# Patient Record
Sex: MALE | Race: WHITE | NOT HISPANIC OR LATINO | Employment: OTHER | URBAN - METROPOLITAN AREA
[De-identification: names, ages, dates, MRNs, and addresses within clinical notes are randomized per-mention and may not be internally consistent; named-entity substitution may affect disease eponyms.]

---

## 2021-11-11 ENCOUNTER — HOSPITAL ENCOUNTER (OUTPATIENT)
Facility: CLINIC | Age: 81
Setting detail: OBSERVATION
Discharge: HOME | End: 2021-11-12
Attending: EMERGENCY MEDICINE | Admitting: INTERNAL MEDICINE
Payer: MEDICARE

## 2021-11-11 ENCOUNTER — APPOINTMENT (OUTPATIENT)
Dept: CT IMAGING | Facility: CLINIC | Age: 81
End: 2021-11-11
Attending: EMERGENCY MEDICINE
Payer: MEDICARE

## 2021-11-11 ENCOUNTER — APPOINTMENT (OUTPATIENT)
Dept: CARDIOLOGY | Facility: CLINIC | Age: 81
End: 2021-11-11
Attending: PHYSICIAN ASSISTANT
Payer: MEDICARE

## 2021-11-11 DIAGNOSIS — U07.1 INFECTION DUE TO 2019 NOVEL CORONAVIRUS: ICD-10-CM

## 2021-11-11 DIAGNOSIS — I21.4 NSTEMI (NON-ST ELEVATED MYOCARDIAL INFARCTION) (H): ICD-10-CM

## 2021-11-11 LAB
ALBUMIN SERPL-MCNC: 2.4 G/DL (ref 3.4–5)
ALP SERPL-CCNC: 117 U/L (ref 40–150)
ALT SERPL W P-5'-P-CCNC: 33 U/L (ref 0–70)
ANION GAP SERPL CALCULATED.3IONS-SCNC: 7 MMOL/L (ref 3–14)
AST SERPL W P-5'-P-CCNC: 32 U/L (ref 0–45)
ATRIAL RATE - MUSE: 61 BPM
ATRIAL RATE - MUSE: 64 BPM
ATRIAL RATE - MUSE: 65 BPM
BASE EXCESS BLDV CALC-SCNC: 0.4 MMOL/L (ref -7.7–1.9)
BASOPHILS # BLD AUTO: 0 10E3/UL (ref 0–0.2)
BASOPHILS NFR BLD AUTO: 1 %
BILIRUB DIRECT SERPL-MCNC: 0.2 MG/DL (ref 0–0.2)
BILIRUB SERPL-MCNC: 0.5 MG/DL (ref 0.2–1.3)
BUN SERPL-MCNC: 24 MG/DL (ref 7–30)
CALCIUM SERPL-MCNC: 8.2 MG/DL (ref 8.5–10.1)
CHLORIDE BLD-SCNC: 109 MMOL/L (ref 94–109)
CO2 SERPL-SCNC: 24 MMOL/L (ref 20–32)
CREAT SERPL-MCNC: 0.92 MG/DL (ref 0.66–1.25)
D DIMER PPP FEU-MCNC: 0.9 UG/ML FEU (ref 0–0.5)
DIASTOLIC BLOOD PRESSURE - MUSE: NORMAL MMHG
EOSINOPHIL # BLD AUTO: 0.1 10E3/UL (ref 0–0.7)
EOSINOPHIL NFR BLD AUTO: 4 %
ERYTHROCYTE [DISTWIDTH] IN BLOOD BY AUTOMATED COUNT: 13.2 % (ref 10–15)
GFR SERPL CREATININE-BSD FRML MDRD: 78 ML/MIN/1.73M2
GLUCOSE BLD-MCNC: 95 MG/DL (ref 70–99)
GLUCOSE BLDC GLUCOMTR-MCNC: 134 MG/DL (ref 70–99)
HCO3 BLDV-SCNC: 24 MMOL/L (ref 21–28)
HCT VFR BLD AUTO: 39.8 % (ref 40–53)
HGB BLD-MCNC: 12.4 G/DL (ref 13.3–17.7)
HOLD SPECIMEN: NORMAL
HOLD SPECIMEN: NORMAL
IMM GRANULOCYTES # BLD: 0 10E3/UL
IMM GRANULOCYTES NFR BLD: 0 %
INTERPRETATION ECG - MUSE: NORMAL
LACTATE SERPL-SCNC: 0.8 MMOL/L (ref 0.7–2)
LIPASE SERPL-CCNC: 49 U/L (ref 73–393)
LVEF ECHO: NORMAL
LYMPHOCYTES # BLD AUTO: 0.8 10E3/UL (ref 0.8–5.3)
LYMPHOCYTES NFR BLD AUTO: 24 %
MAGNESIUM SERPL-MCNC: 1.8 MG/DL (ref 1.6–2.3)
MCH RBC QN AUTO: 30 PG (ref 26.5–33)
MCHC RBC AUTO-ENTMCNC: 31.2 G/DL (ref 31.5–36.5)
MCV RBC AUTO: 96 FL (ref 78–100)
MONOCYTES # BLD AUTO: 0.5 10E3/UL (ref 0–1.3)
MONOCYTES NFR BLD AUTO: 15 %
NEUTROPHILS # BLD AUTO: 1.8 10E3/UL (ref 1.6–8.3)
NEUTROPHILS NFR BLD AUTO: 56 %
NRBC # BLD AUTO: 0 10E3/UL
NRBC BLD AUTO-RTO: 0 /100
O2/TOTAL GAS SETTING VFR VENT: 0 %
P AXIS - MUSE: 18 DEGREES
P AXIS - MUSE: 31 DEGREES
P AXIS - MUSE: 6 DEGREES
PCO2 BLDV: 36 MM HG (ref 40–50)
PH BLDV: 7.44 [PH] (ref 7.32–7.43)
PLATELET # BLD AUTO: 269 10E3/UL (ref 150–450)
PO2 BLDV: 52 MM HG (ref 25–47)
POTASSIUM BLD-SCNC: 3.6 MMOL/L (ref 3.4–5.3)
PR INTERVAL - MUSE: 182 MS
PR INTERVAL - MUSE: 194 MS
PR INTERVAL - MUSE: 198 MS
PROCALCITONIN SERPL-MCNC: <0.05 NG/ML
PROT SERPL-MCNC: 5.3 G/DL (ref 6.8–8.8)
QRS DURATION - MUSE: 86 MS
QRS DURATION - MUSE: 90 MS
QRS DURATION - MUSE: 90 MS
QT - MUSE: 464 MS
QT - MUSE: 470 MS
QT - MUSE: 480 MS
QTC - MUSE: 482 MS
QTC - MUSE: 483 MS
QTC - MUSE: 484 MS
R AXIS - MUSE: -17 DEGREES
R AXIS - MUSE: -22 DEGREES
R AXIS - MUSE: -26 DEGREES
RBC # BLD AUTO: 4.13 10E6/UL (ref 4.4–5.9)
SODIUM SERPL-SCNC: 140 MMOL/L (ref 133–144)
SYSTOLIC BLOOD PRESSURE - MUSE: NORMAL MMHG
T AXIS - MUSE: -3 DEGREES
T AXIS - MUSE: 22 DEGREES
T AXIS - MUSE: 28 DEGREES
TROPONIN I SERPL-MCNC: 0.12 UG/L (ref 0–0.04)
TROPONIN I SERPL-MCNC: 0.13 UG/L (ref 0–0.04)
TROPONIN I SERPL-MCNC: 0.13 UG/L (ref 0–0.04)
VENTRICULAR RATE- MUSE: 61 BPM
VENTRICULAR RATE- MUSE: 64 BPM
VENTRICULAR RATE- MUSE: 65 BPM
WBC # BLD AUTO: 3.3 10E3/UL (ref 4–11)

## 2021-11-11 PROCEDURE — 250N000011 HC RX IP 250 OP 636: Performed by: EMERGENCY MEDICINE

## 2021-11-11 PROCEDURE — 84145 PROCALCITONIN (PCT): CPT | Performed by: EMERGENCY MEDICINE

## 2021-11-11 PROCEDURE — 36415 COLL VENOUS BLD VENIPUNCTURE: CPT | Performed by: PHYSICIAN ASSISTANT

## 2021-11-11 PROCEDURE — 99204 OFFICE O/P NEW MOD 45 MIN: CPT | Mod: 25 | Performed by: INTERNAL MEDICINE

## 2021-11-11 PROCEDURE — 250N000013 HC RX MED GY IP 250 OP 250 PS 637: Performed by: INTERNAL MEDICINE

## 2021-11-11 PROCEDURE — 36415 COLL VENOUS BLD VENIPUNCTURE: CPT | Performed by: EMERGENCY MEDICINE

## 2021-11-11 PROCEDURE — 96365 THER/PROPH/DIAG IV INF INIT: CPT | Mod: 59

## 2021-11-11 PROCEDURE — 96368 THER/DIAG CONCURRENT INF: CPT

## 2021-11-11 PROCEDURE — 99220 PR INITIAL OBSERVATION CARE,LEVEL III: CPT | Performed by: INTERNAL MEDICINE

## 2021-11-11 PROCEDURE — 82248 BILIRUBIN DIRECT: CPT | Performed by: EMERGENCY MEDICINE

## 2021-11-11 PROCEDURE — G0378 HOSPITAL OBSERVATION PER HR: HCPCS

## 2021-11-11 PROCEDURE — 83735 ASSAY OF MAGNESIUM: CPT | Performed by: EMERGENCY MEDICINE

## 2021-11-11 PROCEDURE — 93325 DOPPLER ECHO COLOR FLOW MAPG: CPT | Mod: 26 | Performed by: INTERNAL MEDICINE

## 2021-11-11 PROCEDURE — C9113 INJ PANTOPRAZOLE SODIUM, VIA: HCPCS | Performed by: PHYSICIAN ASSISTANT

## 2021-11-11 PROCEDURE — 85004 AUTOMATED DIFF WBC COUNT: CPT | Performed by: EMERGENCY MEDICINE

## 2021-11-11 PROCEDURE — 96366 THER/PROPH/DIAG IV INF ADDON: CPT

## 2021-11-11 PROCEDURE — 84484 ASSAY OF TROPONIN QUANT: CPT | Performed by: EMERGENCY MEDICINE

## 2021-11-11 PROCEDURE — 258N000003 HC RX IP 258 OP 636: Performed by: PHYSICIAN ASSISTANT

## 2021-11-11 PROCEDURE — 999N000208 ECHOCARDIOGRAM LIMITED

## 2021-11-11 PROCEDURE — 99207 PR APP CREDIT; MD BILLING SHARED VISIT: CPT | Performed by: PHYSICIAN ASSISTANT

## 2021-11-11 PROCEDURE — 96376 TX/PRO/DX INJ SAME DRUG ADON: CPT | Mod: 59

## 2021-11-11 PROCEDURE — 258N000003 HC RX IP 258 OP 636: Performed by: EMERGENCY MEDICINE

## 2021-11-11 PROCEDURE — 93308 TTE F-UP OR LMTD: CPT | Mod: 26 | Performed by: INTERNAL MEDICINE

## 2021-11-11 PROCEDURE — 82803 BLOOD GASES ANY COMBINATION: CPT | Performed by: EMERGENCY MEDICINE

## 2021-11-11 PROCEDURE — 250N000013 HC RX MED GY IP 250 OP 250 PS 637: Performed by: EMERGENCY MEDICINE

## 2021-11-11 PROCEDURE — 250N000009 HC RX 250: Performed by: PHYSICIAN ASSISTANT

## 2021-11-11 PROCEDURE — 71275 CT ANGIOGRAPHY CHEST: CPT | Mod: MG

## 2021-11-11 PROCEDURE — 80048 BASIC METABOLIC PNL TOTAL CA: CPT | Performed by: EMERGENCY MEDICINE

## 2021-11-11 PROCEDURE — 83690 ASSAY OF LIPASE: CPT | Performed by: PHYSICIAN ASSISTANT

## 2021-11-11 PROCEDURE — 99285 EMERGENCY DEPT VISIT HI MDM: CPT | Mod: 25

## 2021-11-11 PROCEDURE — 93321 DOPPLER ECHO F-UP/LMTD STD: CPT | Mod: 26 | Performed by: INTERNAL MEDICINE

## 2021-11-11 PROCEDURE — 999N000208 ECHOCARDIOGRAM COMPLETE

## 2021-11-11 PROCEDURE — 93005 ELECTROCARDIOGRAM TRACING: CPT

## 2021-11-11 PROCEDURE — 250N000009 HC RX 250: Performed by: EMERGENCY MEDICINE

## 2021-11-11 PROCEDURE — 83605 ASSAY OF LACTIC ACID: CPT | Performed by: EMERGENCY MEDICINE

## 2021-11-11 PROCEDURE — 250N000013 HC RX MED GY IP 250 OP 250 PS 637: Performed by: PHYSICIAN ASSISTANT

## 2021-11-11 PROCEDURE — 255N000002 HC RX 255 OP 636: Performed by: EMERGENCY MEDICINE

## 2021-11-11 PROCEDURE — 82962 GLUCOSE BLOOD TEST: CPT

## 2021-11-11 PROCEDURE — 96375 TX/PRO/DX INJ NEW DRUG ADDON: CPT

## 2021-11-11 PROCEDURE — 96367 TX/PROPH/DG ADDL SEQ IV INF: CPT

## 2021-11-11 PROCEDURE — 85379 FIBRIN DEGRADATION QUANT: CPT | Performed by: EMERGENCY MEDICINE

## 2021-11-11 PROCEDURE — 250N000011 HC RX IP 250 OP 636: Performed by: PHYSICIAN ASSISTANT

## 2021-11-11 PROCEDURE — 84484 ASSAY OF TROPONIN QUANT: CPT | Mod: 91 | Performed by: PHYSICIAN ASSISTANT

## 2021-11-11 RX ORDER — RAMIPRIL 10 MG/1
10 CAPSULE ORAL 2 TIMES DAILY
COMMUNITY

## 2021-11-11 RX ORDER — ROSUVASTATIN CALCIUM 20 MG/1
20 TABLET, COATED ORAL AT BEDTIME
Status: DISCONTINUED | OUTPATIENT
Start: 2021-11-11 | End: 2021-11-12 | Stop reason: HOSPADM

## 2021-11-11 RX ORDER — ONDANSETRON 4 MG/1
4 TABLET, ORALLY DISINTEGRATING ORAL EVERY 6 HOURS PRN
Status: DISCONTINUED | OUTPATIENT
Start: 2021-11-11 | End: 2021-11-12 | Stop reason: HOSPADM

## 2021-11-11 RX ORDER — CLOPIDOGREL BISULFATE 75 MG/1
75 TABLET ORAL DAILY
COMMUNITY

## 2021-11-11 RX ORDER — ROSUVASTATIN CALCIUM 20 MG/1
20 TABLET, COATED ORAL AT BEDTIME
COMMUNITY

## 2021-11-11 RX ORDER — CLOPIDOGREL BISULFATE 75 MG/1
75 TABLET ORAL DAILY
Status: DISCONTINUED | OUTPATIENT
Start: 2021-11-11 | End: 2021-11-12 | Stop reason: HOSPADM

## 2021-11-11 RX ORDER — LIDOCAINE 40 MG/G
CREAM TOPICAL
Status: DISCONTINUED | OUTPATIENT
Start: 2021-11-11 | End: 2021-11-12 | Stop reason: HOSPADM

## 2021-11-11 RX ORDER — MORPHINE SULFATE 2 MG/ML
2 INJECTION, SOLUTION INTRAMUSCULAR; INTRAVENOUS
Status: DISCONTINUED | OUTPATIENT
Start: 2021-11-11 | End: 2021-11-12 | Stop reason: HOSPADM

## 2021-11-11 RX ORDER — POLYETHYLENE GLYCOL 3350 17 G/17G
17 POWDER, FOR SOLUTION ORAL DAILY
Status: DISCONTINUED | OUTPATIENT
Start: 2021-11-11 | End: 2021-11-12 | Stop reason: HOSPADM

## 2021-11-11 RX ORDER — PROCHLORPERAZINE 25 MG
12.5 SUPPOSITORY, RECTAL RECTAL EVERY 12 HOURS PRN
Status: DISCONTINUED | OUTPATIENT
Start: 2021-11-11 | End: 2021-11-12 | Stop reason: HOSPADM

## 2021-11-11 RX ORDER — UBIDECARENONE 100 MG
100 CAPSULE ORAL DAILY
COMMUNITY

## 2021-11-11 RX ORDER — BENZONATATE 100 MG/1
100 CAPSULE ORAL 3 TIMES DAILY PRN
Status: DISCONTINUED | OUTPATIENT
Start: 2021-11-11 | End: 2021-11-12 | Stop reason: HOSPADM

## 2021-11-11 RX ORDER — ASPIRIN 325 MG
325 TABLET ORAL ONCE
Status: COMPLETED | OUTPATIENT
Start: 2021-11-11 | End: 2021-11-11

## 2021-11-11 RX ORDER — AMLODIPINE BESYLATE 5 MG/1
5 TABLET ORAL DAILY
Status: DISCONTINUED | OUTPATIENT
Start: 2021-11-11 | End: 2021-11-12 | Stop reason: HOSPADM

## 2021-11-11 RX ORDER — LISINOPRIL 20 MG/1
20 TABLET ORAL DAILY
Status: DISCONTINUED | OUTPATIENT
Start: 2021-11-11 | End: 2021-11-12 | Stop reason: HOSPADM

## 2021-11-11 RX ORDER — ISOSORBIDE MONONITRATE 30 MG/1
90 TABLET, EXTENDED RELEASE ORAL DAILY
COMMUNITY

## 2021-11-11 RX ORDER — ONDANSETRON 2 MG/ML
4 INJECTION INTRAMUSCULAR; INTRAVENOUS EVERY 6 HOURS PRN
Status: DISCONTINUED | OUTPATIENT
Start: 2021-11-11 | End: 2021-11-12 | Stop reason: HOSPADM

## 2021-11-11 RX ORDER — MORPHINE SULFATE 2 MG/ML
2 INJECTION, SOLUTION INTRAMUSCULAR; INTRAVENOUS
Status: COMPLETED | OUTPATIENT
Start: 2021-11-11 | End: 2021-11-11

## 2021-11-11 RX ORDER — FUROSEMIDE 20 MG
10 TABLET ORAL DAILY
COMMUNITY

## 2021-11-11 RX ORDER — AMOXICILLIN 250 MG
2 CAPSULE ORAL 2 TIMES DAILY
Status: DISCONTINUED | OUTPATIENT
Start: 2021-11-11 | End: 2021-11-12 | Stop reason: HOSPADM

## 2021-11-11 RX ORDER — HEPARIN SODIUM 10000 [USP'U]/100ML
0-5000 INJECTION, SOLUTION INTRAVENOUS CONTINUOUS
Status: DISCONTINUED | OUTPATIENT
Start: 2021-11-11 | End: 2021-11-11

## 2021-11-11 RX ORDER — MAGNESIUM CARB/ALUMINUM HYDROX 105-160MG
148 TABLET,CHEWABLE ORAL ONCE
Status: COMPLETED | OUTPATIENT
Start: 2021-11-11 | End: 2021-11-11

## 2021-11-11 RX ORDER — CARVEDILOL 3.12 MG/1
3.12 TABLET ORAL
COMMUNITY

## 2021-11-11 RX ORDER — RANOLAZINE 500 MG/1
500 TABLET, EXTENDED RELEASE ORAL EVERY EVENING
COMMUNITY

## 2021-11-11 RX ORDER — ASPIRIN 81 MG/1
81 TABLET, CHEWABLE ORAL DAILY
COMMUNITY

## 2021-11-11 RX ORDER — ASPIRIN 81 MG/1
81 TABLET, CHEWABLE ORAL DAILY
Status: DISCONTINUED | OUTPATIENT
Start: 2021-11-12 | End: 2021-11-12 | Stop reason: HOSPADM

## 2021-11-11 RX ORDER — PROCHLORPERAZINE MALEATE 5 MG
5 TABLET ORAL EVERY 6 HOURS PRN
Status: DISCONTINUED | OUTPATIENT
Start: 2021-11-11 | End: 2021-11-12 | Stop reason: HOSPADM

## 2021-11-11 RX ORDER — RANOLAZINE 500 MG/1
500 TABLET, EXTENDED RELEASE ORAL EVERY EVENING
Status: DISCONTINUED | OUTPATIENT
Start: 2021-11-11 | End: 2021-11-12 | Stop reason: HOSPADM

## 2021-11-11 RX ORDER — RANOLAZINE 1000 MG/1
1000 TABLET, EXTENDED RELEASE ORAL EVERY MORNING
COMMUNITY

## 2021-11-11 RX ORDER — AMLODIPINE BESYLATE 5 MG/1
5 TABLET ORAL DAILY
COMMUNITY

## 2021-11-11 RX ORDER — IOPAMIDOL 755 MG/ML
500 INJECTION, SOLUTION INTRAVASCULAR ONCE
Status: COMPLETED | OUTPATIENT
Start: 2021-11-11 | End: 2021-11-11

## 2021-11-11 RX ORDER — MULTIPLE VITAMINS W/ MINERALS TAB 9MG-400MCG
1 TAB ORAL DAILY
COMMUNITY

## 2021-11-11 RX ORDER — CARVEDILOL 3.12 MG/1
3.12 TABLET ORAL 2 TIMES DAILY WITH MEALS
Status: DISCONTINUED | OUTPATIENT
Start: 2021-11-11 | End: 2021-11-12 | Stop reason: HOSPADM

## 2021-11-11 RX ORDER — RANOLAZINE 500 MG/1
1000 TABLET, EXTENDED RELEASE ORAL EVERY MORNING
Status: DISCONTINUED | OUTPATIENT
Start: 2021-11-11 | End: 2021-11-12 | Stop reason: HOSPADM

## 2021-11-11 RX ORDER — NITROGLYCERIN 0.4 MG/1
0.4 TABLET SUBLINGUAL EVERY 5 MIN PRN
Status: DISCONTINUED | OUTPATIENT
Start: 2021-11-11 | End: 2021-11-12 | Stop reason: HOSPADM

## 2021-11-11 RX ADMIN — SODIUM CHLORIDE 84 ML: 9 INJECTION, SOLUTION INTRAVENOUS at 06:55

## 2021-11-11 RX ADMIN — MORPHINE SULFATE 2 MG: 2 INJECTION, SOLUTION INTRAMUSCULAR; INTRAVENOUS at 10:16

## 2021-11-11 RX ADMIN — IOPAMIDOL 65 ML: 755 INJECTION, SOLUTION INTRAVENOUS at 06:55

## 2021-11-11 RX ADMIN — AMLODIPINE BESYLATE 5 MG: 5 TABLET ORAL at 12:59

## 2021-11-11 RX ADMIN — ROSUVASTATIN 20 MG: 20 TABLET, FILM COATED ORAL at 23:03

## 2021-11-11 RX ADMIN — ASPIRIN 325 MG ORAL TABLET 325 MG: 325 PILL ORAL at 06:41

## 2021-11-11 RX ADMIN — PANTOPRAZOLE SODIUM 40 MG: 40 INJECTION, POWDER, FOR SOLUTION INTRAVENOUS at 13:39

## 2021-11-11 RX ADMIN — DOCUSATE SODIUM AND SENNOSIDES 2 TABLET: 8.6; 5 TABLET, FILM COATED ORAL at 19:55

## 2021-11-11 RX ADMIN — HEPARIN SODIUM 1050 UNITS/HR: 1000 INJECTION INTRAVENOUS; SUBCUTANEOUS at 06:40

## 2021-11-11 RX ADMIN — HEPARIN SODIUM 1050 UNITS/HR: 1000 INJECTION INTRAVENOUS; SUBCUTANEOUS at 06:41

## 2021-11-11 RX ADMIN — NITROGLYCERIN 0.4 MG: 0.4 TABLET SUBLINGUAL at 12:04

## 2021-11-11 RX ADMIN — DOCUSATE SODIUM AND SENNOSIDES 2 TABLET: 8.6; 5 TABLET, FILM COATED ORAL at 12:58

## 2021-11-11 RX ADMIN — LIDOCAINE HYDROCHLORIDE 30 ML: 20 SOLUTION ORAL; TOPICAL at 12:04

## 2021-11-11 RX ADMIN — ISOSORBIDE MONONITRATE 90 MG: 60 TABLET, EXTENDED RELEASE ORAL at 12:59

## 2021-11-11 RX ADMIN — RANOLAZINE 500 MG: 500 TABLET, FILM COATED, EXTENDED RELEASE ORAL at 19:55

## 2021-11-11 RX ADMIN — MAGNESIUM CITRATE 148 ML: 1.75 LIQUID ORAL at 19:55

## 2021-11-11 RX ADMIN — CARVEDILOL 3.12 MG: 3.12 TABLET, FILM COATED ORAL at 19:54

## 2021-11-11 RX ADMIN — HUMAN ALBUMIN MICROSPHERES AND PERFLUTREN 3 ML: 10; .22 INJECTION, SOLUTION INTRAVENOUS at 11:22

## 2021-11-11 RX ADMIN — LISINOPRIL 20 MG: 20 TABLET ORAL at 12:59

## 2021-11-11 RX ADMIN — CLOPIDOGREL BISULFATE 75 MG: 75 TABLET ORAL at 12:59

## 2021-11-11 RX ADMIN — SODIUM CHLORIDE 50 ML: 9 INJECTION, SOLUTION INTRAVENOUS at 13:41

## 2021-11-11 RX ADMIN — CARVEDILOL 3.12 MG: 3.12 TABLET, FILM COATED ORAL at 12:59

## 2021-11-11 RX ADMIN — POLYETHYLENE GLYCOL 3350 17 G: 17 POWDER, FOR SOLUTION ORAL at 12:58

## 2021-11-11 RX ADMIN — RANOLAZINE 1000 MG: 500 TABLET, FILM COATED, EXTENDED RELEASE ORAL at 12:59

## 2021-11-11 RX ADMIN — REMDESIVIR 200 MG: 100 INJECTION, POWDER, LYOPHILIZED, FOR SOLUTION INTRAVENOUS at 13:04

## 2021-11-11 ASSESSMENT — ENCOUNTER SYMPTOMS
SHORTNESS OF BREATH: 0
FEVER: 0
COUGH: 1
WEAKNESS: 1

## 2021-11-11 ASSESSMENT — ACTIVITIES OF DAILY LIVING (ADL)
ADLS_ACUITY_SCORE: 12
ADLS_ACUITY_SCORE: 12
ADLS_ACUITY_SCORE: 14
ADLS_ACUITY_SCORE: 12
ADLS_ACUITY_SCORE: 14
ADLS_ACUITY_SCORE: 12
ADLS_ACUITY_SCORE: 14

## 2021-11-11 ASSESSMENT — MIFFLIN-ST. JEOR
SCORE: 1500.7
SCORE: 1498.71

## 2021-11-11 NOTE — ED TRIAGE NOTES
Patient states he tested positive for covid on Saturday.  Presents to ER due to chest heaviness that started Saturday, but got much worse over night tonight.  Denies SOB.  Reports extensive cardiac hx including bypass and stents.

## 2021-11-11 NOTE — ED NOTES
"[facility]  ED Nurse Handoff Report    Duane V Edelman is a 81 year old male   ED Chief complaint: Chest Pain  . ED Diagnosis:   Final diagnoses:   Infection due to 2019 novel coronavirus   NSTEMI (non-ST elevated myocardial infarction) (H)     Allergies: No Known Allergies    Code Status: Full Code  Activity level - Baseline/Home:  Independent. Activity Level - Current:   Stand by Assist. Lift room needed: No. Bariatric: No   Needed: No   Isolation: Yes. Infection: Not Applicable  COVID r/o and special precautions.     Vital Signs:   Vitals:    11/11/21 0443 11/11/21 0612 11/11/21 0615   BP: (!) 152/89  (!) 143/80   Pulse: 54  56   Resp: 20  12   Temp: 97  F (36.1  C)     TempSrc: Temporal     SpO2: 96%  95%   Weight:  86.2 kg (190 lb)    Height:  1.651 m (5' 5\")        Cardiac Rhythm:  ,      Pain level:    Patient confused: No. Patient Falls Risk: No.   Elimination Status: Has voided   Patient Report - Initial Complaint: Chest pain since Saturday, worsening now, SOB. Focused Assessment: Pt reports feeling SOB, O2 sats at 95% on RA. Found to have elevated troponin.  Tests Performed: Labs, EKG. Abnormal Results:    Labs Ordered and Resulted from Time of ED Arrival to Time of ED Departure   BASIC METABOLIC PANEL - Abnormal       Result Value    Sodium 140      Potassium 3.6      Chloride 109      Carbon Dioxide (CO2) 24      Anion Gap 7      Urea Nitrogen 24      Creatinine 0.92      Calcium 8.2 (*)     Glucose 95      GFR Estimate 78     TROPONIN I - Abnormal    Troponin I 0.130 (*)    CBC WITH PLATELETS AND DIFFERENTIAL - Abnormal    WBC Count 3.3 (*)     RBC Count 4.13 (*)     Hemoglobin 12.4 (*)     Hematocrit 39.8 (*)     MCV 96      MCH 30.0      MCHC 31.2 (*)     RDW 13.2      Platelet Count 269      % Neutrophils 56      % Lymphocytes 24      % Monocytes 15      % Eosinophils 4      % Basophils 1      % Immature Granulocytes 0      NRBCs per 100 WBC 0      Absolute Neutrophils 1.8   "    Absolute Lymphocytes 0.8      Absolute Monocytes 0.5      Absolute Eosinophils 0.1      Absolute Basophils 0.0      Absolute Immature Granulocytes 0.0      Absolute NRBCs 0.0     MAGNESIUM   LACTIC ACID WHOLE BLOOD   BLOOD GAS VENOUS   TROPONIN I   HEPATIC FUNCTION PANEL   PROCALCITONIN   D DIMER QUANTITATIVE     No orders to display     .   Treatments provided: See MAR  Family Comments: n/a  OBS brochure/video discussed/provided to patient:  Yes  ED Medications:   Medications   sodium chloride (PF) 0.9% PF flush 3 mL (has no administration in time range)   sodium chloride (PF) 0.9% PF flush 3 mL (has no administration in time range)   heparin loading dose for LOW INTENSITY TREATMENT * Give BEFORE starting heparin infusion (has no administration in time range)   heparin infusion 25,000 units in D5W 250 mL ANTICOAGULANT (has no administration in time range)   aspirin (ASA) tablet 325 mg (has no administration in time range)     Drips infusing:  Yes  For the majority of the shift, the patient's behavior Green. Interventions performed were n/a.    Sepsis treatment initiated: No     Patient tested for COVID 19 prior to admission: YES    ED Nurse Name/Phone Number: Betty Farris RN,   6:23 AM

## 2021-11-11 NOTE — PLAN OF CARE
Pertinent Assessments: A/Ox4. VSS. LS dim. Infrequent cough noted. Epigastric chest pain 4/10 reported. Pt reports constipation. Hesitancy with voiding per pt report, Voided via urinal. Bladder scan pre-void for 388.   Major Shift Events: Hep gtt discontinued.   Treatment Plan: Remdesivir. Pt needs to have a stool. Cards recommended outpatient stress test.

## 2021-11-11 NOTE — H&P
History and Physical     Duane V Edelman MRN# [id number]   YOB: 1940 Age: 81 year old      Date of Admission:  11/11/2021    Primary care provider: Mony Meraz          Assessment and Plan:   Duane V Edelman is a unvaccinated 81 year old male with a PMH significant for complex coronary artery disease, hypertension, hyperlipidemia and recent diagnosis of Covid who presents with cough, weakness, chest tightness and upper abdominal pressure.    Patient was discussed with Dr. Duran, who was provider in ED. Chart review of ED work up was reviewed as well as chart review of Care Everywhere, previous visits and admissions.     #NSTEMI with atypical chest discomfort  Patient presents with complaint of epigastric pressure and central chest tightness starting at approximately 2000 on 11/10 not associated with shortness of breath, lightheadedness or dizziness but has had some diaphoresis.  Symptoms continued overnight with presentation to the ED.  EKG is nonischemic and initial troponin was elevated at 0.130.  Repeat 1 hour later was 0.116.  He was started on a heparin drip and on reevaluation continues to have constant epigastric pressure and central chest tightness.  Clinically this sounds more GI in nature but I cannot explain why his troponins are elevated.  Repeat EKG is stable.  -Trend troponin  -Echocardiogram  -Received full dose aspirin and will continue with baby aspirin daily  -Cardiology consult  -Continue home medicines including beta-blocker, statin, Imdur, Plavix and Ranexa  -Give Protonix IV and GI cocktail   -Add lipase      #COVID-19 infection  Patient is not vaccinated for unknown reasons.  Describes nonproductive cough, lack of taste, lack of appetite and weakness for approximately 2 weeks with positive Covid test on 11/6.  He is afebrile and nonhypoxic.  CT chest does not show PE but does show groundglass opacities in the lungs compatible with COVID-19.  -Start Remdisivir which he  is agreeable to  -Given he is not hypoxic does not need dexamethasone  -No oxygen needed at this point    #History of complex coronary artery disease  #Chronic stable angina  Patient follows with Dr. Ridley..  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  He is managed on chronic Plavix and antianginal medicines.  -Continue Plavix, aspirin, Ranexa   -Continue Imdur      #Hypertension  -Continue Ramipril (replace with lisinopril while admitted) and Amlodipine  -Hold Lasix    #Hyperlipidemia  -Continue Crestor      Social: Lives at home.  Code: Discussed with patient and they have chosen DNR/DNI  VTE prophylaxis: On heparin drip  Disposition: Inpatient                    Chief Complaint:   Atypical chest and abdominal discomfort         History of Present Illness:   Duane V Edelman is a unvaccinated 81 year old male who presents with multiple complaints.  For approximately the past 2 weeks he has been experiencing a dry cough.  He has also had loss of taste, body aches, vomiting and poor oral intake.  He had a Covid test at an outside facility on 11/6 that was positive.  Last night he had the development of upper abdominal pressure as well as tightness in the center of his chest.  This discomfort did not radiate nor was it associated with shortness of breath, nausea or lightheadedness.  He may have felt a little diaphoretic with it.  His blood pressure and pulse were normal so he tried to sleep but unfortunately the pressure worsened so he presented to the ER.  Since being in the ER it is not changed nor has it worsened.  He has not had discomfort like this before.  He is taking all of his medications as prescribed without any recent changes.  He had an EGD recently that did not show any gastritis.  He does not drink alcohol or smoke cigarettes.  He does not give a reason for why he is unvaccinated.                 Past Medical History:     Past Medical History:    Diagnosis Date     Benign essential hypertension      CAD (coronary artery disease)      Cataract      Hyperlipidemia      Infection due to 2019 novel coronavirus 11/2021    NOT vaccinated at time of diagnosis     Psoriasis                Past Surgical History:     Past Surgical History:   Procedure Laterality Date     CABG NOS  12/2008    2-vessel,  (x2) LIMA to LAD and SVG to Diag-1     Cataract surgery NOS Left      CHOLECYSTECTOMY       Coronary artery stent placement  2014    PCI and IVORY to LM, IVORY to Ramus, IVORY to ostial Cx     ESOPHAGOSCOPY, GASTROSCOPY, DUODENOSCOPY (EGD), COMBINED                 Social History:     Social History     Socioeconomic History     Marital status:      Spouse name: Not on file     Number of children: Not on file     Years of education: Not on file     Highest education level: Not on file   Occupational History     Not on file   Tobacco Use     Smoking status: Not on file     Smokeless tobacco: Not on file   Substance and Sexual Activity     Alcohol use: Not on file     Drug use: Not on file     Sexual activity: Not on file   Other Topics Concern     Not on file   Social History Narrative     Not on file     Social Determinants of Health     Financial Resource Strain: Not on file   Food Insecurity: Not on file   Transportation Needs: Not on file   Physical Activity: Not on file   Stress: Not on file   Social Connections: Not on file   Intimate Partner Violence: Not on file   Housing Stability: Not on file               Family History:   Family history reviewed and is non contributory         Allergies:    No Known Allergies            Medications:     Prior to Admission medications    Medication Sig Last Dose Taking? Auth Provider   aspirin (ASA) 81 MG chewable tablet Take 81 mg by mouth daily  Yes Reported, Patient   carvedilol (COREG) 3.125 MG tablet Take 3.125 mg by mouth 2 times daily (with meals)  Yes Reported, Patient   clopidogrel (PLAVIX) 75 MG tablet Take 75 mg  "by mouth daily  Yes Reported, Patient   Isosorbide Mononitrate (IMDUR PO)   Yes Reported, Patient   Ramipril (ALTACE PO)   Yes Reported, Patient   RANOLAZINE ER PO   Yes Reported, Patient   Rosuvastatin Calcium (CRESTOR PO)   Yes Reported, Patient              Review of Systems:   A Comprehensive greater than 10 system review of systems was carried out.  Pertinent positives and negatives are noted above.  Otherwise negative for contributory information.            Physical Exam:   Blood pressure 118/81, pulse 57, temperature 97  F (36.1  C), temperature source Temporal, resp. rate 22, height 1.651 m (5' 5\"), weight 86.2 kg (190 lb), SpO2 94 %.  Exam:  GENERAL:  Comfortable.  PSYCH: pleasant, oriented, No acute distress.  HEENT:  PERRLA. Normal conjunctiva, normal hearing, nasal mucosa and Oropharynx are normal.  NECK:  Supple, no neck vein distention, adenopathy or bruits, normal thyroid.  HEART:  Normal S1, S2 with no murmur, no pericardial rub, gallops or S3 or S4.  LUNGS:  Clear to auscultation, normal Respiratory effort. No wheezing, rales or ronchi.  ABDOMEN:  Soft, no hepatosplenomegaly, normal bowel sounds. Non-tender, non distended.   EXTREMITIES:  No pedal edema, +2 pulses bilateral and equal.  SKIN:  Dry to touch, No rash, wound or ulcerations.  NEUROLOGIC:  CN 2-12 grossly intact,  sensation is intact with no focal deficits.               Data:     Recent Labs   Lab 11/11/21  0508   WBC 3.3*   HGB 12.4*   HCT 39.8*   MCV 96        Recent Labs   Lab 11/11/21 0617 11/11/21  0508   NA  --  140   POTASSIUM  --  3.6   CHLORIDE  --  109   CO2  --  24   ANIONGAP  --  7   GLC  --  95   BUN  --  24   CR  --  0.92   GFRESTIMATED  --  78   RMOEO  --  8.2*   MAG 1.8  --    PROTTOTAL 5.3*  --    ALBUMIN 2.4*  --    BILITOTAL 0.5  --    ALKPHOS 117  --    AST 32  --    ALT 33  --      Recent Labs   Lab 11/11/21  0617 11/11/21  0508   TROPONIN 0.116* 0.130*         Recent Results (from the past 24 hour(s))   CT " Chest Pulmonary Embolism w Contrast    Narrative    CT CHEST PULMONARY EMBOLISM WITH CONTRAST  11/11/2021 7:12 AM    CLINICAL HISTORY: Elevated dimer, COVID+.    TECHNIQUE: CT angiogram chest during arterial phase injection IV  contrast. 2D and 3D MIP reconstructions were performed by the CT  technologist. Dose reduction techniques were used.     CONTRAST: 65 mL [id number]    COMPARISON: None.    FINDINGS:  ANGIOGRAM CHEST: No evidence of pulmonary embolism. Thoracic aorta is  negative for dissection.    LUNGS AND PLEURA: Patchy groundglass and interstitial opacities in the  periphery of the lung compatible with COVID-19. No effusions.    MEDIASTINUM/AXILLAE: Dilated ascending thoracic aorta measures 4.4 cm.  The main pulmonary artery is also dilated at 4.0 cm. Heart size is  upper normal. No mediastinal, hilar, or axillary lymphadenopathy.    CORONARY ARTERY CALCIFICATION: Previous intervention (stents or CABG).    UPPER ABDOMEN: Diffuse low-attenuation of the liver compatible fatty  infiltration. Small hiatal hernia. Previous cholecystectomy.    MUSCULOSKELETAL: Unremarkable.      Impression    IMPRESSION:  1.  No evidence of pulmonary embolism.  2.  Groundglass opacities in the lungs compatible with COVID-19.  3.  Dilated ascending thoracic aorta at 4.4 cm.  4.  Dilated main pulmonary artery may be related to chronic pulmonary  arterial hypertension.    RAHAT RAMACHANDRAN MD         SYSTEM ID:  [id number]         Parvin Pabon PA-C    This patient was seen and discussed with Dr. Smith who agrees with the current plans as outlined above.

## 2021-11-11 NOTE — CONSULTS
Cardiology Consultation:    Duane V Edelman MRN#: [id number]   YOB: 1940 Age: 81 year old     Date of Admission: 11/11/2021  Consult indication: elevated troponin         Assessment and Plan / Recommendations:      # Elevated troponin, peak 0.130, flat trajectory.  Clinical presentation is most consistent with type II MI due to COVID-19 pneumonia.  Patient denies symptoms concerning for angina.  TTE demonstrates findings consistent with known history of coronary artery disease.  # Extensive history of CAD with h/o CABG and subsequent PCI complicated by early ISR. Followed by Dr. Ridley.   # Aorta dilatation   # HTN  # HL    -Overall clinical presentation does not seem consistent with an acute coronary syndrome or decompensated heart failure  -Continue current cardiac regimen of aspirin, clopidogrel, Imdur, lisinopril, amlodipine, rosuvastatin, ranolazine  -Maintain potassium greater than 4, magnesium greater than 2  -Follow-up with his outpatient cardiology team through the [facility] system in 2 weeks    Thank you for allowing our team to participate in the care of Duane V Edelman. Please do not hesitate to page me with any questions or concerns.    Narendra Aviles MD, [facility]  Cardiology  Text Page   November 11, 2021    Voice recognition software utilized. Although reviewed after completion, some word and grammatical errors may be present.         History of Present Illness:     I had the opportunity to see patient Duane V Edelman at [facility] for a cardiology consultation.     As you know, patient is a pleasant 81-year-old male with a past medical history significant for hyperlipidemia, hypertension, extensive history of coronary artery disease with history of bypass surgery, who was admitted on 11/11/2021 with COVID-19 pneumonia.    Patient has a known history of coronary artery disease with history of CABG and multiple subsequent PCI for early in-stent restenosis, including left main stent,  stent and proximal left circumflex, stent in proximal ramus, ostial PDA occlusion, RCA collateralized by LAD, SVG to D1 with stents.  He is followed by Dr. Ridley with cardiology through the [facility] system.  Last ischemic evaluation was a nuclear stress test 2/9/2021 that demonstrated a small defect consistent with prior anterior/anteroseptal infarct, with no reversible ischemia.    Patient states that over the past several days he has had abdominal discomfort, cough, and developed a loss of taste and smell.  He had a Covid test done at home that was positive.  He denies any recent episodes of chest pain/chest pressure, denies symptoms similar to his prior anginal symptoms.  He denies any palpitations, presyncope/syncope.    Initial ECG demonstrates normal sinus rhythm with first-degree AV block, left axis deviation,  ms, no acute ischemic changes.    Labs are notable for initial troponin 0.130, next troponin 0.116.  D-dimer 0.9.  CT PE study negative for PE, however it did demonstrate groundglass opacities compatible with COVID-19.  Ascending aorta 4.4 cm.    TTE demonstrates LVEF 55 to 60%, mild mid anteroseptal hypokinesis, no significant valvular abnormalities.      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         Past Medical History:   I have reviewed this patient's past medical history  The ASCVD Risk score (Katelyn LISA Jr., et al., 2013) failed to calculate for the following reasons:    The 2013 ASCVD risk score is only valid for ages 40 to 79    The patient has a prior MI or stroke diagnosis  Past Medical History:   Diagnosis Date     Benign essential hypertension      CAD (coronary artery disease)      Cataract      Hyperlipidemia      Infection due to 2019 novel coronavirus 11/2021    NOT vaccinated at time of diagnosis     Psoriasis              Past Surgical History:   I  have reviewed this patient's past surgical history   Past Surgical History:   Procedure Laterality Date     CABG NOS  12/2008    2-vessel,  (x2) LIMA to LAD and SVG to Diag-1     Cataract surgery NOS Left      CHOLECYSTECTOMY       Coronary artery stent placement  2014    PCI and IVORY to LM, IVORY to Ramus, IVORY to ostial Cx     ESOPHAGOSCOPY, GASTROSCOPY, DUODENOSCOPY (EGD), COMBINED               Social History:   I have reviewed this patient's social history  Social History     Tobacco Use     Smoking status: Not on file     Smokeless tobacco: Not on file   Substance Use Topics     Alcohol use: Not on file             Family History:   I have reviewed this patient's family history  No family history on file.          Allergies:   I have reviewed this patient's allergy history  No Known Allergies          Medications reviewed:   Prior to admission medications:  Prior to Admission medications    Medication Sig Start Date End Date Taking? Authorizing Provider   amLODIPine (NORVASC) 5 MG tablet Take 5 mg by mouth daily   Yes Unknown, Entered By History   aspirin (ASA) 81 MG chewable tablet Take 81 mg by mouth daily   Yes Reported, Patient   carvedilol (COREG) 3.125 MG tablet Take 3.125 mg by mouth daily with food    Yes Reported, Patient   clopidogrel (PLAVIX) 75 MG tablet Take 75 mg by mouth daily   Yes Reported, Patient   co-enzyme Q-10 100 MG CAPS capsule Take 100 mg by mouth daily   Yes Unknown, Entered By History   furosemide (LASIX) 20 MG tablet Take 10 mg by mouth daily (1/2 tablet = 10 mg)   Yes Unknown, Entered By History   isosorbide mononitrate (IMDUR) 30 MG 24 hr tablet Take 90 mg by mouth daily   Yes Unknown, Entered By History   multivitamin w/minerals (THERA-VIT-M) tablet Take 1 tablet by mouth daily   Yes Unknown, Entered By History   ramipril (ALTACE) 10 MG capsule Take 10 mg by mouth 2 times daily   Yes Unknown, Entered By History   ranolazine (RANEXA) 1000 MG TB12 12 hr tablet Take 1,000 mg by mouth  every morning   Yes Unknown, Entered By History   ranolazine (RANEXA) 500 MG 12 hr tablet Take 500 mg by mouth every evening   Yes Unknown, Entered By History   rosuvastatin (CRESTOR) 20 MG tablet Take 20 mg by mouth At Bedtime   Yes Unknown, Entered By History      Current medications:  Current Facility-Administered Medications Ordered in Epic   Medication Dose Route Frequency Last Rate Last Admin     [START ON 11/12/2021] remdesivir 100 mg in sodium chloride 0.9 % 250 mL intermittent infusion  100 mg Intravenous Q24H        And     [START ON 11/12/2021] 0.9% sodium chloride BOLUS  50 mL Intravenous Q24H         amLODIPine (NORVASC) tablet 5 mg  5 mg Oral Daily   5 mg at 11/11/21 1259     [START ON 11/12/2021] aspirin (ASA) chewable tablet 81 mg  81 mg Oral Daily         benzonatate (TESSALON) capsule 100 mg  100 mg Oral TID PRN         carvedilol (COREG) tablet 3.125 mg  3.125 mg Oral BID w/meals   3.125 mg at 11/11/21 1259     clopidogrel (PLAVIX) tablet 75 mg  75 mg Oral Daily   75 mg at 11/11/21 1259     heparin infusion 25,000 units in D5W 250 mL ANTICOAGULANT  0-5,000 Units/hr Intravenous Continuous 10.5 mL/hr at 11/11/21 1234 1,050 Units/hr at 11/11/21 1234     isosorbide mononitrate (IMDUR) 24 hr tablet 90 mg  90 mg Oral Daily   90 mg at 11/11/21 1259     lidocaine (LMX4) cream   Topical Q1H PRN         lidocaine (LMX4) cream   Topical Q1H PRN         lidocaine (XYLOCAINE) 2 % 15 mL, alum & mag hydroxide-simethicone (MAALOX) 15 mL GI Cocktail  30 mL Oral TID PRN   30 mL at 11/11/21 1204     lidocaine 1 % 0.1-1 mL  0.1-1 mL Other Q1H PRN         lidocaine 1 % 0.1-1 mL  0.1-1 mL Other Q1H PRN         lisinopril (ZESTRIL) tablet 20 mg  20 mg Oral Daily   20 mg at 11/11/21 1259     melatonin tablet 1 mg  1 mg Oral At Bedtime PRN         morphine (PF) injection 2 mg  2 mg Intravenous Q2H PRN         nitroGLYcerin (NITROSTAT) sublingual tablet 0.4 mg  0.4 mg Sublingual Q5 Min PRN   0.4 mg at 11/11/21 1207      ondansetron (ZOFRAN-ODT) ODT tab 4 mg  4 mg Oral Q6H PRN        Or     ondansetron (ZOFRAN) injection 4 mg  4 mg Intravenous Q6H PRN         pantoprazole (PROTONIX) IV push injection 40 mg  40 mg Intravenous Daily with breakfast   40 mg at 11/11/21 1339     Patient is already receiving anticoagulation with heparin, enoxaparin (LOVENOX), warfarin (COUMADIN)  or other anticoagulant medication   Does not apply Continuous PRN         polyethylene glycol (MIRALAX) Packet 17 g  17 g Oral Daily   17 g at 11/11/21 1258     prochlorperazine (COMPAZINE) injection 5 mg  5 mg Intravenous Q6H PRN        Or     prochlorperazine (COMPAZINE) tablet 5 mg  5 mg Oral Q6H PRN        Or     prochlorperazine (COMPAZINE) suppository 12.5 mg  12.5 mg Rectal Q12H PRN         ranolazine (RANEXA) 12 hr tablet 1,000 mg  1,000 mg Oral QAM   1,000 mg at 11/11/21 1259     ranolazine (RANEXA) 12 hr tablet 500 mg  500 mg Oral QPM         rosuvastatin (CRESTOR) tablet 20 mg  20 mg Oral At Bedtime         senna-docusate (SENOKOT-S/PERICOLACE) 8.6-50 MG per tablet 2 tablet  2 tablet Oral BID   2 tablet at 11/11/21 1258     sodium chloride (PF) 0.9% PF flush 3 mL  3 mL Intracatheter Q8H         sodium chloride (PF) 0.9% PF flush 3 mL  3 mL Intracatheter q1 min prn         sodium chloride (PF) 0.9% PF flush 3 mL  3 mL Intracatheter Q8H         sodium chloride (PF) 0.9% PF flush 3 mL  3 mL Intracatheter q1 min prn         Current Outpatient Medications Ordered in Epic   Medication     amLODIPine (NORVASC) 5 MG tablet     aspirin (ASA) 81 MG chewable tablet     carvedilol (COREG) 3.125 MG tablet     clopidogrel (PLAVIX) 75 MG tablet     co-enzyme Q-10 100 MG CAPS capsule     furosemide (LASIX) 20 MG tablet     isosorbide mononitrate (IMDUR) 30 MG 24 hr tablet     multivitamin w/minerals (THERA-VIT-M) tablet     ramipril (ALTACE) 10 MG capsule     ranolazine (RANEXA) 1000 MG TB12 12 hr tablet     ranolazine (RANEXA) 500 MG 12 hr tablet     rosuvastatin  (CRESTOR) 20 MG tablet             Review of Systems:   A complete review of systems was performed and was negative except as mentioned in the HPI.          Physical Exam:   Vital signs were personally reviewed:  Temperatures:  Current - Temp: 97.4  F (36.3  C); Max - Temp  Av.2  F (36.2  C)  Min: 97  F (36.1  C)  Max: 97.4  F (36.3  C)  Respiration range: Resp  Av.9  Min: 10  Max: 25  Pulse range: Pulse  Av.5  Min: 54  Max: 70  Blood pressure range: Systolic (24hrs), Av , Min:118 , Max:152   ; Diastolic (24hrs), Av, Min:65, Max:89    Pulse oximetry range: SpO2  Av.3 %  Min: 92 %  Max: 97 %  No intake or output data in the 24 hours ending 21 1444  190 lbs 0 oz  Body mass index is 31.62 kg/m .   Body surface area is 1.99 meters squared.      Constitutional: appears stated age, in no apparent distress, appears to be well nourished  Eyes: sclera anicteric, conjunctiva normal, no lesions on eyelids or lashes  ENT: normocephalic, without obvious abnormality, atraumatic, external ears without lesions,   Pulmonary: mildly coarse bilaterally   Cardiovascular: JVP normal, regular rate, regular rhythm, 1/6 SHER at the RUSB, no lower extremity edema  Gastrointestinal: abdominal exam benign, non-tender, no rigidity, no guarding  Neurologic: awake, alert, face symmetrical, moves all extremities  Skin: no abnormal rashes or lesions on limited exam, nails normal without discoloration or clubbing, no jaundice  Psychiatric: affect is normal, answers questions appropriately, oriented to self and place         Laboratory tests:   Laboratory tests personally reviewed:   CMP  Recent Labs   Lab 21  0617 21  0508   NA  --  140   POTASSIUM  --  3.6   CHLORIDE  --  109   CO2  --  24   ANIONGAP  --  7   GLC  --  95   BUN  --  24   CR  --  0.92   GFRESTIMATED  --  78   ROMEO  --  8.2*   MAG 1.8  --    PROTTOTAL 5.3*  --    ALBUMIN 2.4*  --    BILITOTAL 0.5  --    ALKPHOS 117  --    AST 32  --    ALT  33  --      CBC  Recent Labs   Lab 21  0508   WBC 3.3*   RBC 4.13*   HGB 12.4*   HCT 39.8*   MCV 96   MCH 30.0   MCHC 31.2*   RDW 13.2        INRNo lab results found in last 7 days.  Lab Results   Component Value Date    TROPONIN 0.126 (HH) 2021    TROPONIN 0.116 (H) 2021    TROPONIN 0.130 (HH) 2021     No results for input(s): CHOL, HDL, LDL, TRIG, CHOLHDLRATIO in the last 20868 hours.  No results found for: A1C  No results found for: TSH         Imaging and Additional Data:   Additional data personally reviewed:  Recent Results (from the past 4320 hour(s))   Echo Limited   Result Value    LVEF  55-60%    Narrative    [id number]  [id number]  [id number]  [id number]^JOCELYNN^MALACHI^JESSI     [facility]  Echocardiography Laboratory  [address] [place] [address]     Name: EDELMAN, DUANE V  MRN: [id number]  : 1940  Study Date: 2021 10:54 AM  Age: 81 yrs  Gender: Male  Patient Location: [facility]  Reason For Study: MI  Ordering Physician: MALACHI CABEZAS  Performed By: Tamara Gomes     BSA: 1.9 m2  Height: 65 in  Weight: 190 lb  BP: 146/82 mmHg  ______________________________________________________________________________  Procedure  Complete Portable Echo Adult. Optison (NDC #[id number]) given intravenously.  ______________________________________________________________________________  Interpretation Summary     The left ventricle is normal in size.  There is normal left ventricular wall thickness.  The visual ejection fraction is 55-60%.  No regional wall motion abnormalities noted.  Diastolic Doppler findings (E/E' ratio and/or other parameters) suggest left  ventricular filling pressures are increased.  The left atrium is moderately dilated.  There is moderate trileaflet aortic sclerosis.  No hemodynamically significant valvular aortic stenosis. There is a small mean  gradient of 13 mmHg across the aortic valve, but valve area is calculated  2.8  cm2 and DI=0.56.  The ascending aorta is Mildly dilated, 4.3 cm.  ______________________________________________________________________________  Left Ventricle  The left ventricle is normal in size. There is normal left ventricular wall  thickness. The visual ejection fraction is 55-60%. Diastolic Doppler findings  (E/E' ratio and/or other parameters) suggest left ventricular filling  pressures are increased. No regional wall motion abnormalities noted.     Right Ventricle  The right ventricle is normal in structure, function and size.     Atria  The left atrium is moderately dilated. The right atrium is mild to moderately  dilated. There is no atrial shunt seen.     Mitral Valve  The mitral valve leaflets appear normal. There is no evidence of stenosis,  fluttering, or prolapse. There is trace mitral regurgitation.     Tricuspid Valve  Normal tricuspid valve. There is physiologic tricuspid regurgitation. IVC  diameter and respiratory changes fall into an intermediate range suggesting an  RA pressure of 8 mmHg.     Aortic Valve  There is moderate trileaflet aortic sclerosis. There is trace aortic  regurgitation. No hemodynamically significant valvular aortic stenosis.     Pulmonic Valve  The pulmonic valve is not well seen, but is grossly normal.     Vessels  Borderline aortic root dilatation. The ascending aorta is Mildly dilated.     Pericardium  There is no pericardial effusion.     Rhythm  Sinus rhythm was noted.  ______________________________________________________________________________  MMode/2D Measurements & Calculations  IVSd: 1.2 cm  LVIDd: 5.4 cm  LVIDs: 3.3 cm  LVPWd: 1.2 cm  FS: 39.9 %  LV mass(C)d: 256.9 grams  LV mass(C)dI: 132.7 grams/m2  Ao root diam: 4.0 cm  asc Aorta Diam: 4.3 cm  LVOT diam: 2.5 cm  LVOT area: 4.9 cm2  LA Volume (BP): 82.9 ml  LA Volume Index (BP): 42.7 ml/m2     RWT: 0.42     Doppler Measurements & Calculations  MV E max pattie: 104.8 cm/sec  MV A max pattie: 74.7 cm/sec  MV  E/A: 1.4  MV max P.7 mmHg  MV mean P.2 mmHg  MV V2 VTI: 37.4 cm  MVA(VTI): 4.5 cm2  MV dec time: 0.17 sec  Ao V2 max: 244.0 cm/sec  Ao max P.0 mmHg  Ao V2 mean: 171.0 cm/sec  Ao mean P.0 mmHg  Ao V2 VTI: 59.2 cm  DIETER(I,D): 2.8 cm2  DIETER(V,D): 2.7 cm2  LV V1 max P.4 mmHg  LV V1 max: 136.0 cm/sec  LV V1 VTI: 34.2 cm  SV(LVOT): 167.9 ml  SI(LVOT): 86.7 ml/m2  TR max srini: 201.5 cm/sec  TR max P.2 mmHg  AV Srini Ratio (DI): 0.56  DIETER Index (cm2/m2): 1.5  E/E' av.8  Lateral E/e': 10.4  Medial E/e': 19.3     ______________________________________________________________________________  Report approved by: Elaine Reynoso 2021 12:33 PM            Clinically Significant Risk Factors Present on Admission             # Platelet Defect: home medication list includes an antiplatelet medication     Pneumonia

## 2021-11-11 NOTE — PHARMACY-ADMISSION MEDICATION HISTORY
Admission medication history interview status for this patient is complete. See [facility] admission navigator for allergy information, prior to admission medications and immunization status.     Medication history interview done, indicate source(s): Patient  Medication history resources (including written lists, pill bottles, clinic record):epic list, fill history  Ph[facility]: [facility]    Changes made to PTA medication list:  Added: doses for amlodipine, Lasix, Imdur, Altace, Ranolazine, CrestorMVI, CO-Q 10  Changed: Coreg to Once daily  Reported as Not Taking: famotidine  Removed: famotidine    Actions taken by pharmacist (provider contacted, etc):None     Additional medication history information:None    Medication reconciliation/reorder completed by provider prior to medication history?  N   (Y/N)       Prior to Admission medications    Medication Sig Last Dose Taking? Auth Provider   amLODIPine (NORVASC) 5 MG tablet Take 5 mg by mouth daily 11/10/2021 at AM Yes Unknown, Entered By History   aspirin (ASA) 81 MG chewable tablet Take 81 mg by mouth daily 11/10/2021 at AM Yes Reported, Patient   carvedilol (COREG) 3.125 MG tablet Take 3.125 mg by mouth daily with food  11/10/2021 at AM Yes Reported, Patient   clopidogrel (PLAVIX) 75 MG tablet Take 75 mg by mouth daily 11/10/2021 at AM Yes Reported, Patient   co-enzyme Q-10 100 MG CAPS capsule Take 100 mg by mouth daily 11/10/2021 at AM Yes Unknown, Entered By History   furosemide (LASIX) 20 MG tablet Take 10 mg by mouth daily (1/2 tablet = 10 mg) 11/10/2021 at AM Yes Unknown, Entered By History   isosorbide mononitrate (IMDUR) 30 MG 24 hr tablet Take 90 mg by mouth daily 11/10/2021 at AM Yes Unknown, Entered By History   multivitamin w/minerals (THERA-VIT-M) tablet Take 1 tablet by mouth daily 11/10/2021 at AM Yes Unknown, Entered By History   ramipril (ALTACE) 10 MG capsule Take 10 mg by mouth 2 times daily 11/10/2021 at PM Yes Unknown, Entered By History    ranolazine (RANEXA) 1000 MG TB12 12 hr tablet Take 1,000 mg by mouth every morning 11/10/2021 at AM Yes Unknown, Entered By History   ranolazine (RANEXA) 500 MG 12 hr tablet Take 500 mg by mouth every evening 11/10/2021 at PM Yes Unknown, Entered By History   rosuvastatin (CRESTOR) 20 MG tablet Take 20 mg by mouth At Bedtime 11/10/2021 at PM Yes Unknown, Entered By History

## 2021-11-11 NOTE — ED PROVIDER NOTES
"  History     Chief Complaint:  Chest Pain     The history is provided by the patient.      Duane V Edelman is a 81 year old male on Plavix, unvaccinated against COVID-19, with history of hypertension, hyperlipidemia, CAD, NSTEMI, angina, prediabetes who presents with 5 days COVID-19 symptoms. He reports that he tested positive for COVID-19 5 days ago and has been experiencing a mild cough and generalized weakness. Yesterday, he developed some chest pain which he describes as a \"heaviness\" and worsened overnight tonight. He has not had any shortness of breath with this. Duane denies fever.    Review of Systems   Constitutional: Negative for fever.   Respiratory: Positive for cough. Negative for shortness of breath.    Cardiovascular: Positive for chest pain.   Neurological: Positive for weakness.   All other systems reviewed and are negative.    Allergies:  Atorvastatin    Medications:  Amlodipine  Aspirin 81 mg  Coreg  Plavix  Pepcid  Lasix  Imdur  Ramipril  Ranolazine  Rosuvastatin  Nitroglycerin    Past Medical History:    Hypertension  CAD  Cataract   Hyperlipidemia  COVID-19  Psoriasis  Macular dystrophy    Past Surgical History:    CABG x2  Cataract removal  Cholecystectomy  Coronary artery stent placement x2  EGD x2    Family History:    Father: MI    Social History:  Patient presents to the ED with his wife.  Patient presents to the ED via car.    Physical Exam     Patient Vitals for the past 24 hrs:   BP Temp Temp src Pulse Resp SpO2 Height Weight   11/11/21 0615  143/80 -- -- 56 12 95 % -- --   11/11/21 0612 -- -- -- -- -- -- 1.651 m (5' 5\") 86.2 kg (190 lb)   11/11/21 0443  152/89 97  F (36.1  C) Temporal 54 20 96 % -- --     Physical Exam  Nursing note and vitals reviewed.  Constitutional: Cooperative.   HENT:   Mouth/Throat: Mucous membranes are normal.   Cardiovascular: Normal rate, regular rhythm and normal heart sounds.  No murmur.  Pulmonary/Chest: Effort normal and breath sounds normal. No " respiratory distress. No wheezes. No rales.   Abdominal: Soft. Normal appearance and bowel sounds are normal. No distension. There is no tenderness.   Musculoskeletal: No LE edema  Neurological: Alert. Oriented x4  Skin: Skin is warm and dry.   Psychiatric: Normal mood and affect.      Emergency Department Course     ECG  ECG taken at 0448, ECG read at 0450  Normal sinus rhythm  Prolonged QT  Rate 64 bpm. WI interval 198 ms. QRS duration 90 ms. QT/QTc 470/484 ms. P-R-T axes 6 -17 28.     Imaging:  CT Chest Pulmonary Embolism w Contrast    (Results Pending)     Laboratory:  Labs Ordered and Resulted from Time of ED Arrival to Time of ED Departure   BASIC METABOLIC PANEL - Abnormal       Result Value    Sodium 140      Potassium 3.6      Chloride 109      Carbon Dioxide (CO2) 24      Anion Gap 7      Urea Nitrogen 24      Creatinine 0.92      Calcium 8.2 (*)     Glucose 95      GFR Estimate 78     TROPONIN I - Abnormal    Troponin I 0.130 (*)    CBC WITH PLATELETS AND DIFFERENTIAL - Abnormal    WBC Count 3.3 (*)     RBC Count 4.13 (*)     Hemoglobin 12.4 (*)     Hematocrit 39.8 (*)     MCV 96      MCH 30.0      MCHC 31.2 (*)     RDW 13.2      Platelet Count 269      % Neutrophils 56      % Lymphocytes 24      % Monocytes 15      % Eosinophils 4      % Basophils 1      % Immature Granulocytes 0      NRBCs per 100 WBC 0      Absolute Neutrophils 1.8      Absolute Lymphocytes 0.8      Absolute Monocytes 0.5      Absolute Eosinophils 0.1      Absolute Basophils 0.0      Absolute Immature Granulocytes 0.0      Absolute NRBCs 0.0     BLOOD GAS VENOUS - Abnormal    pH Venous 7.44 (*)     pCO2 Venous 36 (*)     pO2 Venous 52 (*)     Bicarbonate Venous 24      Base Excess/Deficit (+/-) 0.4      FIO2 0     TROPONIN I - Abnormal    Troponin I 0.116 (*)    HEPATIC FUNCTION PANEL - Abnormal    Bilirubin Total 0.5      Bilirubin Direct 0.2      Protein Total 5.3 (*)     Albumin 2.4 (*)     Alkaline Phosphatase 117      AST 32       ALT 33     D DIMER QUANTITATIVE - Abnormal    D-Dimer Quantitative 0.90 (*)    MAGNESIUM - Normal    Magnesium 1.8     LACTIC ACID WHOLE BLOOD - Normal    Lactic Acid 0.8     PROCALCITONIN     Reviewed:  I reviewed nursing notes, vitals, past medical history, care everywhere, and [facility]    Assessments:  0551 I obtained history and examined the patient as noted above.   0609 I rechecked the patient and explained findings.     Consults:   0549 Laboratory called with a critical troponin value of 0.130.    Interventions:  0641 Aspirin 325 mg PO  0642 Heparin loading dose 5,150 units IV followed by Heparin 1,050 units/hour IV    Disposition:  Plan to admit to telemetry later today.    Impression & Plan     Medical Decision Making:  Duane V Edelman is a 81 year old male who is not vaccinated for COVID with current COVID infection based on a home test from several days ago (his wife is positive too) who presents to the emergency department with chest pain and weakness. His EKG was reassuring, but unfortunately his troponin is elevated, consistent with non-ST elevated myocardial infarction. I suspect underlying COVID pneumonia to be a precipitant of this. His dimer is elevated, and thus we will order CT scan to ensure there is no thrombotic disease such as pulmonary embolism. This is pending at this time. He is not hypoxic. Heparin has been initiated as well as aspirin. He will be admitted to the hospitalist service later today with further treatment pending the results of the CT scan.    Diagnosis:    ICD-10-CM    1. Infection due to 2019 novel coronavirus  U07.1    2. NSTEMI (non-ST elevated myocardial infarction) (H)  I21.4      Scribe Disclosure:  I, Alfreda Burnette, am serving as a scribe at 5:45 AM on 11/11/2021 to document services personally performed by Murtaza Mayfield MD based on my observations and the provider's statements to me.       Murtaza Mayfield MD  11/11/21 4006

## 2021-11-12 VITALS
WEIGHT: 190.44 LBS | HEART RATE: 57 BPM | HEIGHT: 65 IN | DIASTOLIC BLOOD PRESSURE: 69 MMHG | RESPIRATION RATE: 18 BRPM | BODY MASS INDEX: 31.73 KG/M2 | TEMPERATURE: 98.9 F | SYSTOLIC BLOOD PRESSURE: 112 MMHG | OXYGEN SATURATION: 96 %

## 2021-11-12 LAB
ANION GAP SERPL CALCULATED.3IONS-SCNC: 6 MMOL/L (ref 3–14)
BUN SERPL-MCNC: 24 MG/DL (ref 7–30)
CALCIUM SERPL-MCNC: 8.3 MG/DL (ref 8.5–10.1)
CHLORIDE BLD-SCNC: 110 MMOL/L (ref 94–109)
CO2 SERPL-SCNC: 25 MMOL/L (ref 20–32)
CREAT SERPL-MCNC: 0.82 MG/DL (ref 0.66–1.25)
ERYTHROCYTE [DISTWIDTH] IN BLOOD BY AUTOMATED COUNT: 13.2 % (ref 10–15)
GFR SERPL CREATININE-BSD FRML MDRD: 84 ML/MIN/1.73M2
GLUCOSE BLD-MCNC: 84 MG/DL (ref 70–99)
GLUCOSE BLDC GLUCOMTR-MCNC: 136 MG/DL (ref 70–99)
GLUCOSE BLDC GLUCOMTR-MCNC: 90 MG/DL (ref 70–99)
HCT VFR BLD AUTO: 35.9 % (ref 40–53)
HGB BLD-MCNC: 11.4 G/DL (ref 13.3–17.7)
MAGNESIUM SERPL-MCNC: 2.5 MG/DL (ref 1.6–2.3)
MCH RBC QN AUTO: 29.8 PG (ref 26.5–33)
MCHC RBC AUTO-ENTMCNC: 31.8 G/DL (ref 31.5–36.5)
MCV RBC AUTO: 94 FL (ref 78–100)
PLATELET # BLD AUTO: 282 10E3/UL (ref 150–450)
POTASSIUM BLD-SCNC: 3.6 MMOL/L (ref 3.4–5.3)
RBC # BLD AUTO: 3.82 10E6/UL (ref 4.4–5.9)
SODIUM SERPL-SCNC: 141 MMOL/L (ref 133–144)
WBC # BLD AUTO: 3 10E3/UL (ref 4–11)

## 2021-11-12 PROCEDURE — 83735 ASSAY OF MAGNESIUM: CPT | Performed by: INTERNAL MEDICINE

## 2021-11-12 PROCEDURE — 80048 BASIC METABOLIC PNL TOTAL CA: CPT | Performed by: PHYSICIAN ASSISTANT

## 2021-11-12 PROCEDURE — 99217 PR OBSERVATION CARE DISCHARGE: CPT | Performed by: INTERNAL MEDICINE

## 2021-11-12 PROCEDURE — 82962 GLUCOSE BLOOD TEST: CPT

## 2021-11-12 PROCEDURE — 36415 COLL VENOUS BLD VENIPUNCTURE: CPT | Performed by: PHYSICIAN ASSISTANT

## 2021-11-12 PROCEDURE — 250N000013 HC RX MED GY IP 250 OP 250 PS 637: Performed by: PHYSICIAN ASSISTANT

## 2021-11-12 PROCEDURE — G0378 HOSPITAL OBSERVATION PER HR: HCPCS

## 2021-11-12 PROCEDURE — 85027 COMPLETE CBC AUTOMATED: CPT | Performed by: PHYSICIAN ASSISTANT

## 2021-11-12 RX ADMIN — CARVEDILOL 3.12 MG: 3.12 TABLET, FILM COATED ORAL at 09:32

## 2021-11-12 RX ADMIN — ISOSORBIDE MONONITRATE 90 MG: 60 TABLET, EXTENDED RELEASE ORAL at 09:32

## 2021-11-12 RX ADMIN — CLOPIDOGREL BISULFATE 75 MG: 75 TABLET ORAL at 09:32

## 2021-11-12 RX ADMIN — RANOLAZINE 1000 MG: 500 TABLET, FILM COATED, EXTENDED RELEASE ORAL at 09:33

## 2021-11-12 RX ADMIN — LISINOPRIL 20 MG: 20 TABLET ORAL at 09:33

## 2021-11-12 RX ADMIN — AMLODIPINE BESYLATE 5 MG: 5 TABLET ORAL at 09:31

## 2021-11-12 RX ADMIN — ASPIRIN 81 MG CHEWABLE TABLET 81 MG: 81 TABLET CHEWABLE at 09:31

## 2021-11-12 NOTE — PLAN OF CARE
A&OX4. Ls clear. VSS Oxygen 93% on room air. Denied any sob with activity. Up with SBA to bathroom. Magnesium Citrate for constipation. No BM yet during this shift. Continue monitoring.

## 2021-11-12 NOTE — PLAN OF CARE
AXOX4. Was continent B&B tonight. SBA. LS clear. Skin warm and dry. Does have a mole/growth on L ear.He denies any family history of ulcer disease or gallbladder disease.  He denies any family history of inflammatory bowel disease or irritable bowel syndrome. Any irritation at site. Tele: SR/SB w/ prolonged QT. K & mag protocols. Denies any pain or chest pain. Able to make needs known.

## 2021-11-12 NOTE — PLAN OF CARE
Vss A&OX4 up ind. Denies sob, cough or cp. Denies light headed or dizziness. Complains of lose stools d/t laxatives for constipation. Eating and drinking well. Electrolytes wnl. No cramping. Discharge inst reviewed. No new meds. Discharge to home.

## 2021-11-12 NOTE — DISCHARGE SUMMARY
Service Date: 11/12/2021  Discharge Date: 11/12/2021    PRINCIPAL FINAL DIAGNOSES:    1.  Upper epigastric pain.  Suspect symptoms gastrointestinal related, possibly due to constipation.  2.  Detectable troponin this admission with minimal elevation.  Echocardiogram performed with ongoing epigastric pain symptoms showed no evidence of wall motion abnormalities with normal ejection fraction.  Appreciate Cardiology input this admission, felt symptoms were not cardiac related.  3.  COVID-19 infection.  The patient was asymptomatic and not hypoxic.  Imaging this admission did demonstrate ground-glass opacities compatible with COVID-19.  4.  History of coronary artery disease with previous coronary bypass graft and multiple stents.  5.  Hypertension.  6.  Hyperlipidemia.    PRINCIPAL PROCEDURES THIS ADMISSION:    1.  Cardiology consultation.  2.  Echocardiogram showing ejection fraction 55%-60% with no wall motion abnormalities.  3.  Serial troponin enzymes, which were elevated in the 0.12 range.  4.  Chest CT scan.    REASON FOR ADMISSION:  Please see dictated history and physical.  In brief, Mr. Finney is an 81-year-old male who presented to the hospital with complaints of upper abdominal and epigastric pressure.  ER workup included troponin enzymes were mildly detectable.  He was admitted to the hospitalist service for evaluation.    HOSPITAL COURSE:    1.  Epigastric discomfort:  The patient had troponin levels drawn in the ER that were mildly detectable.  He was admitted to rule out cardiac ischemia as a cause of his symptoms.  Echocardiogram was performed while the patient was having symptoms, which showed no wall motion abnormalities and normal ejection fraction.  The patient was seen by Cardiology who felt that the patient's symptoms were not related to cardiac ischemia.  Serial troponin levels were in the 0.12 range and stable.  Symptoms improved with treatment of constipation.  The patient had several bowel  movements while hospitalized and epigastric fullness and pressure resolved.  The patient was instructed to follow up with his usual cardiologist in the [facility] system.  He does have an extensive history of coronary artery disease with previous bypass graft surgery and PCI therapy.    In regards to the patient's COVID-19 infection, he is asymptomatic.  He had imaging findings consistent with COVID-19.  He did receive several doses of remdesivir while hospitalized.  He did not receive dexamethasone as he was not hypoxic.  He is not vaccinated.  He has an oximeter at home and will follow oxygen saturations.  Instructed to return to the hospital if saturations fall and remain less than 90% without recovery.    DISCHARGE MEDICATIONS:    1.  Amlodipine 5 mg daily.  2.  Aspirin 81 mg daily.  3.  Plavix 75 mg daily.  4.  Coenzyme Q10.  5.  Coreg 3.125 mg daily.  6.  Lasix 20 mg daily.  7.  Imdur 90 mg daily.  8.  Multivitamin daily.  9.  Ramipril 10 mg twice a day.  10.  Ranexa 500 mg every evening.  11.  Ranexa 1000 mg every morning.  12.  Crestor 20 mg at bedtime.    FOLLOWUP INSTRUCTIONS:    1.  Check your oxygen saturations 4 times a day for the next 2 weeks.  If saturations fall and remain below 90% without recovery, recommend he return to the ER for evaluation.    I examined the patient on day of discharge.    Jhonny Smith MD        D: 2021   T: 2021   MT: JUAN    Name:     EDELMAN, DUANE V.  MRN:      [id number]        Account:      [id number]   :      1940           Service Date: 2021                                  Discharge Date: 2021     Document: [id number]

## 2021-11-12 NOTE — PROGRESS NOTES
Pt seen and examined.  Had several BM overnight after mag citrate given.  He appears stable for discharge home today    Pt has no sx from COVID and is not hypoxic.  No therapy needed on discharge.  Has a home oximiter to follow sats several times a day and I instructed him to return to the ER if his saturations fall below 90% and fail to improve

## 2023-01-01 ENCOUNTER — APPOINTMENT (OUTPATIENT)
Dept: CARDIOLOGY | Facility: CLINIC | Age: 83
End: 2023-01-01
Attending: EMERGENCY MEDICINE
Payer: MEDICARE

## 2023-01-01 ENCOUNTER — APPOINTMENT (OUTPATIENT)
Dept: CT IMAGING | Facility: CLINIC | Age: 83
End: 2023-01-01
Attending: EMERGENCY MEDICINE
Payer: MEDICARE

## 2023-01-01 ENCOUNTER — HOSPITAL ENCOUNTER (EMERGENCY)
Facility: CLINIC | Age: 83
Discharge: OTHER INSTITUTION | End: 2023-05-30
Attending: EMERGENCY MEDICINE | Admitting: EMERGENCY MEDICINE
Payer: MEDICARE

## 2023-01-01 ENCOUNTER — APPOINTMENT (OUTPATIENT)
Dept: GENERAL RADIOLOGY | Facility: CLINIC | Age: 83
End: 2023-01-01
Attending: EMERGENCY MEDICINE
Payer: MEDICARE

## 2023-01-01 ENCOUNTER — HOSPITAL ENCOUNTER (EMERGENCY)
Facility: CLINIC | Age: 83
Discharge: HOME | End: 2023-09-08
Attending: EMERGENCY MEDICINE | Admitting: EMERGENCY MEDICINE
Payer: MEDICARE

## 2023-01-01 VITALS
RESPIRATION RATE: 22 BRPM | TEMPERATURE: 97.1 F | BODY MASS INDEX: 34.38 KG/M2 | HEART RATE: 65 BPM | WEIGHT: 206.57 LBS | SYSTOLIC BLOOD PRESSURE: 122 MMHG | DIASTOLIC BLOOD PRESSURE: 75 MMHG | OXYGEN SATURATION: 99 %

## 2023-01-01 VITALS
RESPIRATION RATE: 15 BRPM | SYSTOLIC BLOOD PRESSURE: 136 MMHG | HEART RATE: 60 BPM | OXYGEN SATURATION: 97 % | BODY MASS INDEX: 32.03 KG/M2 | HEIGHT: 66 IN | TEMPERATURE: 98 F | DIASTOLIC BLOOD PRESSURE: 75 MMHG | WEIGHT: 199.3 LBS

## 2023-01-01 DIAGNOSIS — I50.33 ACUTE ON CHRONIC DIASTOLIC CONGESTIVE HEART FAILURE (H): ICD-10-CM

## 2023-01-01 DIAGNOSIS — D64.9 ANEMIA, UNSPECIFIED TYPE: ICD-10-CM

## 2023-01-01 DIAGNOSIS — J96.01 ACUTE RESPIRATORY FAILURE WITH HYPOXIA (H): ICD-10-CM

## 2023-01-01 DIAGNOSIS — I50.9 ACUTE CONGESTIVE HEART FAILURE, UNSPECIFIED HEART FAILURE TYPE (H): ICD-10-CM

## 2023-01-01 LAB
ABO/RH(D): NORMAL
ALBUMIN SERPL BCG-MCNC: 4.4 G/DL (ref 3.5–5.2)
ALLEN'S TEST: YES
ALP SERPL-CCNC: 61 U/L (ref 40–129)
ALT SERPL W P-5'-P-CCNC: 16 U/L (ref 0–70)
ANION GAP SERPL CALCULATED.3IONS-SCNC: 10 MMOL/L (ref 7–15)
ANION GAP SERPL CALCULATED.3IONS-SCNC: 12 MMOL/L (ref 7–15)
ANTIBODY SCREEN: NEGATIVE
AST SERPL W P-5'-P-CCNC: 20 U/L (ref 0–45)
ATRIAL RATE - MUSE: 64 BPM
ATRIAL RATE - MUSE: 74 BPM
ATRIAL RATE - MUSE: 89 BPM
BASE EXCESS BLDA CALC-SCNC: -5.2 MMOL/L (ref -9–1.8)
BASOPHILS # BLD AUTO: 0 10E3/UL (ref 0–0.2)
BASOPHILS # BLD AUTO: 0.1 10E3/UL (ref 0–0.2)
BASOPHILS NFR BLD AUTO: 1 %
BASOPHILS NFR BLD AUTO: 1 %
BILIRUB DIRECT SERPL-MCNC: <0.2 MG/DL (ref 0–0.3)
BILIRUB SERPL-MCNC: 0.4 MG/DL
BUN SERPL-MCNC: 19.8 MG/DL (ref 8–23)
BUN SERPL-MCNC: 25.3 MG/DL (ref 8–23)
CALCIUM SERPL-MCNC: 9.1 MG/DL (ref 8.8–10.2)
CALCIUM SERPL-MCNC: 9.1 MG/DL (ref 8.8–10.2)
CHLORIDE SERPL-SCNC: 102 MMOL/L (ref 98–107)
CHLORIDE SERPL-SCNC: 104 MMOL/L (ref 98–107)
CREAT BLD-MCNC: 1.2 MG/DL (ref 0.7–1.3)
CREAT SERPL-MCNC: 1.02 MG/DL (ref 0.67–1.17)
CREAT SERPL-MCNC: 1.12 MG/DL (ref 0.67–1.17)
D DIMER PPP FEU-MCNC: 1.14 UG/ML FEU (ref 0–0.5)
D DIMER PPP FEU-MCNC: 1.24 UG/ML FEU (ref 0–0.5)
DEPRECATED HCO3 PLAS-SCNC: 22 MMOL/L (ref 22–29)
DEPRECATED HCO3 PLAS-SCNC: 23 MMOL/L (ref 22–29)
DIASTOLIC BLOOD PRESSURE - MUSE: NORMAL MMHG
EGFRCR SERPLBLD CKD-EPI 2021: 73 ML/MIN/1.73M2
EOSINOPHIL # BLD AUTO: 0.1 10E3/UL (ref 0–0.7)
EOSINOPHIL # BLD AUTO: 0.2 10E3/UL (ref 0–0.7)
EOSINOPHIL NFR BLD AUTO: 3 %
EOSINOPHIL NFR BLD AUTO: 4 %
ERYTHROCYTE [DISTWIDTH] IN BLOOD BY AUTOMATED COUNT: 18 % (ref 10–15)
ERYTHROCYTE [DISTWIDTH] IN BLOOD BY AUTOMATED COUNT: 18.6 % (ref 10–15)
GFR SERPL CREATININE-BSD FRML MDRD: 66 ML/MIN/1.73M2
GFR SERPL CREATININE-BSD FRML MDRD: >60 ML/MIN/1.73M2
GLUCOSE SERPL-MCNC: 107 MG/DL (ref 70–99)
GLUCOSE SERPL-MCNC: 115 MG/DL (ref 70–99)
HCO3 BLD-SCNC: 19 MMOL/L (ref 21–28)
HCT VFR BLD AUTO: 29.9 % (ref 40–53)
HCT VFR BLD AUTO: 38.5 % (ref 40–53)
HGB BLD-MCNC: 12.3 G/DL (ref 13.3–17.7)
HGB BLD-MCNC: 8.8 G/DL (ref 13.3–17.7)
IMM GRANULOCYTES # BLD: 0 10E3/UL
IMM GRANULOCYTES # BLD: 0 10E3/UL
IMM GRANULOCYTES NFR BLD: 0 %
IMM GRANULOCYTES NFR BLD: 0 %
INR PPP: 0.99 (ref 0.85–1.15)
INTERPRETATION ECG - MUSE: NORMAL
LVEF ECHO: NORMAL
LYMPHOCYTES # BLD AUTO: 0.9 10E3/UL (ref 0.8–5.3)
LYMPHOCYTES # BLD AUTO: 1.2 10E3/UL (ref 0.8–5.3)
LYMPHOCYTES NFR BLD AUTO: 20 %
LYMPHOCYTES NFR BLD AUTO: 32 %
MAGNESIUM SERPL-MCNC: 2 MG/DL (ref 1.7–2.3)
MCH RBC QN AUTO: 21.8 PG (ref 26.5–33)
MCH RBC QN AUTO: 25.9 PG (ref 26.5–33)
MCHC RBC AUTO-ENTMCNC: 29.4 G/DL (ref 31.5–36.5)
MCHC RBC AUTO-ENTMCNC: 31.9 G/DL (ref 31.5–36.5)
MCV RBC AUTO: 74 FL (ref 78–100)
MCV RBC AUTO: 81 FL (ref 78–100)
MONOCYTES # BLD AUTO: 0.5 10E3/UL (ref 0–1.3)
MONOCYTES # BLD AUTO: 0.7 10E3/UL (ref 0–1.3)
MONOCYTES NFR BLD AUTO: 14 %
MONOCYTES NFR BLD AUTO: 15 %
NEUTROPHILS # BLD AUTO: 1.8 10E3/UL (ref 1.6–8.3)
NEUTROPHILS # BLD AUTO: 2.7 10E3/UL (ref 1.6–8.3)
NEUTROPHILS NFR BLD AUTO: 50 %
NEUTROPHILS NFR BLD AUTO: 60 %
NRBC # BLD AUTO: 0 10E3/UL
NRBC # BLD AUTO: 0 10E3/UL
NRBC BLD AUTO-RTO: 0 /100
NRBC BLD AUTO-RTO: 0 /100
NT-PROBNP SERPL-MCNC: 1359 PG/ML (ref 0–1800)
NT-PROBNP SERPL-MCNC: 845 PG/ML (ref 0–1800)
O2/TOTAL GAS SETTING VFR VENT: 60 %
P AXIS - MUSE: -7 DEGREES
P AXIS - MUSE: 79 DEGREES
P AXIS - MUSE: 94 DEGREES
PCO2 BLD: 34 MM HG (ref 35–45)
PH BLD: 7.37 [PH] (ref 7.35–7.45)
PLATELET # BLD AUTO: 204 10E3/UL (ref 150–450)
PLATELET # BLD AUTO: 235 10E3/UL (ref 150–450)
PO2 BLD: 80 MM HG (ref 80–105)
POTASSIUM SERPL-SCNC: 4.4 MMOL/L (ref 3.4–5.3)
POTASSIUM SERPL-SCNC: 4.5 MMOL/L (ref 3.4–5.3)
PR INTERVAL - MUSE: 214 MS
PR INTERVAL - MUSE: 218 MS
PR INTERVAL - MUSE: 248 MS
PROT SERPL-MCNC: 7.2 G/DL (ref 6.4–8.3)
QRS DURATION - MUSE: 92 MS
QRS DURATION - MUSE: 96 MS
QRS DURATION - MUSE: 98 MS
QT - MUSE: 376 MS
QT - MUSE: 406 MS
QT - MUSE: 436 MS
QTC - MUSE: 449 MS
QTC - MUSE: 450 MS
QTC - MUSE: 457 MS
R AXIS - MUSE: -27 DEGREES
R AXIS - MUSE: -34 DEGREES
R AXIS - MUSE: 81 DEGREES
RBC # BLD AUTO: 4.04 10E6/UL (ref 4.4–5.9)
RBC # BLD AUTO: 4.74 10E6/UL (ref 4.4–5.9)
SODIUM SERPL-SCNC: 136 MMOL/L (ref 136–145)
SODIUM SERPL-SCNC: 137 MMOL/L (ref 136–145)
SPECIMEN EXPIRATION DATE: NORMAL
SYSTOLIC BLOOD PRESSURE - MUSE: NORMAL MMHG
T AXIS - MUSE: -51 DEGREES
T AXIS - MUSE: 117 DEGREES
T AXIS - MUSE: 158 DEGREES
T4 FREE SERPL-MCNC: 1.14 NG/DL (ref 0.9–1.7)
TROPONIN T SERPL HS-MCNC: 31 NG/L
TROPONIN T SERPL HS-MCNC: 35 NG/L
TROPONIN T SERPL HS-MCNC: 42 NG/L
TROPONIN T SERPL HS-MCNC: 46 NG/L
TSH SERPL DL<=0.005 MIU/L-ACNC: 6 UIU/ML (ref 0.3–4.2)
VENTRICULAR RATE- MUSE: 64 BPM
VENTRICULAR RATE- MUSE: 74 BPM
VENTRICULAR RATE- MUSE: 89 BPM
WBC # BLD AUTO: 3.7 10E3/UL (ref 4–11)
WBC # BLD AUTO: 4.6 10E3/UL (ref 4–11)

## 2023-01-01 PROCEDURE — 85379 FIBRIN DEGRADATION QUANT: CPT | Performed by: EMERGENCY MEDICINE

## 2023-01-01 PROCEDURE — 84443 ASSAY THYROID STIM HORMONE: CPT | Performed by: EMERGENCY MEDICINE

## 2023-01-01 PROCEDURE — 96367 TX/PROPH/DG ADDL SEQ IV INF: CPT

## 2023-01-01 PROCEDURE — 80048 BASIC METABOLIC PNL TOTAL CA: CPT | Performed by: EMERGENCY MEDICINE

## 2023-01-01 PROCEDURE — 999N000185 HC STATISTIC TRANSPORT TIME EA 15 MIN

## 2023-01-01 PROCEDURE — 36415 COLL VENOUS BLD VENIPUNCTURE: CPT | Performed by: EMERGENCY MEDICINE

## 2023-01-01 PROCEDURE — 93321 DOPPLER ECHO F-UP/LMTD STD: CPT | Mod: 26 | Performed by: INTERNAL MEDICINE

## 2023-01-01 PROCEDURE — 93005 ELECTROCARDIOGRAM TRACING: CPT | Mod: 76

## 2023-01-01 PROCEDURE — 93005 ELECTROCARDIOGRAM TRACING: CPT

## 2023-01-01 PROCEDURE — 255N000002 HC RX 255 OP 636: Performed by: EMERGENCY MEDICINE

## 2023-01-01 PROCEDURE — 83880 ASSAY OF NATRIURETIC PEPTIDE: CPT | Performed by: EMERGENCY MEDICINE

## 2023-01-01 PROCEDURE — 71046 X-RAY EXAM CHEST 2 VIEWS: CPT

## 2023-01-01 PROCEDURE — 96368 THER/DIAG CONCURRENT INF: CPT

## 2023-01-01 PROCEDURE — 99292 CRITICAL CARE ADDL 30 MIN: CPT

## 2023-01-01 PROCEDURE — 83735 ASSAY OF MAGNESIUM: CPT | Performed by: EMERGENCY MEDICINE

## 2023-01-01 PROCEDURE — 250N000011 HC RX IP 250 OP 636

## 2023-01-01 PROCEDURE — 36600 WITHDRAWAL OF ARTERIAL BLOOD: CPT

## 2023-01-01 PROCEDURE — 250N000011 HC RX IP 250 OP 636: Performed by: EMERGENCY MEDICINE

## 2023-01-01 PROCEDURE — 71045 X-RAY EXAM CHEST 1 VIEW: CPT

## 2023-01-01 PROCEDURE — 71045 X-RAY EXAM CHEST 1 VIEW: CPT | Mod: 76

## 2023-01-01 PROCEDURE — 82248 BILIRUBIN DIRECT: CPT | Performed by: EMERGENCY MEDICINE

## 2023-01-01 PROCEDURE — 250N000013 HC RX MED GY IP 250 OP 250 PS 637: Performed by: EMERGENCY MEDICINE

## 2023-01-01 PROCEDURE — 84439 ASSAY OF FREE THYROXINE: CPT | Performed by: EMERGENCY MEDICINE

## 2023-01-01 PROCEDURE — 96366 THER/PROPH/DIAG IV INF ADDON: CPT

## 2023-01-01 PROCEDURE — 999N000157 HC STATISTIC RCP TIME EA 10 MIN

## 2023-01-01 PROCEDURE — 85025 COMPLETE CBC W/AUTO DIFF WBC: CPT | Performed by: EMERGENCY MEDICINE

## 2023-01-01 PROCEDURE — 999N000208 ECHOCARDIOGRAM LIMITED

## 2023-01-01 PROCEDURE — 96376 TX/PRO/DX INJ SAME DRUG ADON: CPT

## 2023-01-01 PROCEDURE — 84484 ASSAY OF TROPONIN QUANT: CPT | Mod: 91 | Performed by: EMERGENCY MEDICINE

## 2023-01-01 PROCEDURE — 93325 DOPPLER ECHO COLOR FLOW MAPG: CPT | Mod: 26 | Performed by: INTERNAL MEDICINE

## 2023-01-01 PROCEDURE — 94660 CPAP INITIATION&MGMT: CPT

## 2023-01-01 PROCEDURE — 96365 THER/PROPH/DIAG IV INF INIT: CPT | Mod: 59

## 2023-01-01 PROCEDURE — 84484 ASSAY OF TROPONIN QUANT: CPT | Performed by: EMERGENCY MEDICINE

## 2023-01-01 PROCEDURE — 82803 BLOOD GASES ANY COMBINATION: CPT | Performed by: EMERGENCY MEDICINE

## 2023-01-01 PROCEDURE — 250N000009 HC RX 250: Performed by: EMERGENCY MEDICINE

## 2023-01-01 PROCEDURE — 82565 ASSAY OF CREATININE: CPT

## 2023-01-01 PROCEDURE — 85610 PROTHROMBIN TIME: CPT | Performed by: EMERGENCY MEDICINE

## 2023-01-01 PROCEDURE — 99285 EMERGENCY DEPT VISIT HI MDM: CPT | Mod: 25

## 2023-01-01 PROCEDURE — G1010 CDSM STANSON: HCPCS

## 2023-01-01 PROCEDURE — 99291 CRITICAL CARE FIRST HOUR: CPT

## 2023-01-01 PROCEDURE — 71275 CT ANGIOGRAPHY CHEST: CPT | Mod: MA

## 2023-01-01 PROCEDURE — C8924 2D TTE W OR W/O FOL W/CON,FU: HCPCS

## 2023-01-01 PROCEDURE — 96375 TX/PRO/DX INJ NEW DRUG ADDON: CPT

## 2023-01-01 PROCEDURE — 86850 RBC ANTIBODY SCREEN: CPT | Performed by: EMERGENCY MEDICINE

## 2023-01-01 PROCEDURE — 93308 TTE F-UP OR LMTD: CPT | Mod: 26 | Performed by: INTERNAL MEDICINE

## 2023-01-01 RX ORDER — NITROGLYCERIN 20 MG/100ML
INJECTION INTRAVENOUS
Status: DISCONTINUED
Start: 2023-01-01 | End: 2023-01-01 | Stop reason: HOSPADM

## 2023-01-01 RX ORDER — NITROGLYCERIN 80 MG/1
1 PATCH TRANSDERMAL DAILY
Status: DISCONTINUED | OUTPATIENT
Start: 2023-01-01 | End: 2023-01-01

## 2023-01-01 RX ORDER — IOPAMIDOL 755 MG/ML
500 INJECTION, SOLUTION INTRAVASCULAR ONCE
Status: COMPLETED | OUTPATIENT
Start: 2023-01-01 | End: 2023-01-01

## 2023-01-01 RX ORDER — HEPARIN SODIUM 10000 [USP'U]/100ML
0-5000 INJECTION, SOLUTION INTRAVENOUS CONTINUOUS
Status: DISCONTINUED | OUTPATIENT
Start: 2023-01-01 | End: 2023-01-01 | Stop reason: HOSPADM

## 2023-01-01 RX ORDER — PROPOFOL 10 MG/ML
INJECTION, EMULSION INTRAVENOUS
Status: DISCONTINUED
Start: 2023-01-01 | End: 2023-01-01 | Stop reason: HOSPADM

## 2023-01-01 RX ORDER — NITROGLYCERIN 20 MG/100ML
10-200 INJECTION INTRAVENOUS CONTINUOUS
Status: DISCONTINUED | OUTPATIENT
Start: 2023-01-01 | End: 2023-01-01 | Stop reason: HOSPADM

## 2023-01-01 RX ORDER — FUROSEMIDE 20 MG
20 TABLET ORAL DAILY
Qty: 5 TABLET | Refills: 0 | Status: SHIPPED | OUTPATIENT
Start: 2023-01-01 | End: 2023-01-01

## 2023-01-01 RX ORDER — FUROSEMIDE 10 MG/ML
60 INJECTION INTRAMUSCULAR; INTRAVENOUS ONCE
Status: COMPLETED | OUTPATIENT
Start: 2023-01-01 | End: 2023-01-01

## 2023-01-01 RX ORDER — FUROSEMIDE 10 MG/ML
20 INJECTION INTRAMUSCULAR; INTRAVENOUS ONCE
Status: COMPLETED | OUTPATIENT
Start: 2023-01-01 | End: 2023-01-01

## 2023-01-01 RX ADMIN — SODIUM CHLORIDE 96 ML: 9 INJECTION, SOLUTION INTRAVENOUS at 09:29

## 2023-01-01 RX ADMIN — NITROGLYCERIN 10 MCG/MIN: 20 INJECTION INTRAVENOUS at 02:46

## 2023-01-01 RX ADMIN — FUROSEMIDE 20 MG: 10 INJECTION, SOLUTION INTRAMUSCULAR; INTRAVENOUS at 02:58

## 2023-01-01 RX ADMIN — IOPAMIDOL 78 ML: 755 INJECTION, SOLUTION INTRAVENOUS at 09:28

## 2023-01-01 RX ADMIN — FUROSEMIDE 60 MG: 10 INJECTION, SOLUTION INTRAMUSCULAR; INTRAVENOUS at 02:12

## 2023-01-01 RX ADMIN — HUMAN ALBUMIN MICROSPHERES AND PERFLUTREN 2 ML: 10; .22 INJECTION, SOLUTION INTRAVENOUS at 04:00

## 2023-01-01 RX ADMIN — IOPAMIDOL 68 ML: 755 INJECTION, SOLUTION INTRAVENOUS at 04:22

## 2023-01-01 RX ADMIN — NITROGLYCERIN 1 PATCH: 0.4 PATCH TRANSDERMAL at 02:24

## 2023-01-01 RX ADMIN — HEPARIN SODIUM AND DEXTROSE 1100 UNITS/HR: 10000; 5 INJECTION INTRAVENOUS at 03:01

## 2023-01-01 ASSESSMENT — ACTIVITIES OF DAILY LIVING (ADL)
ADLS_ACUITY_SCORE: 35

## 2023-05-30 NOTE — PROGRESS NOTES
A BiPAP of  12/6 @ 30% was applied to the pt via the mask for an increase in WOB and/or SOB.  The bridge of the nose looks good and remains intact. Pt is tolerating it well. Will continue to monitor and assess the pt's current respiratory status and needs.      Demetra Mello, RT

## 2023-05-30 NOTE — ED PROVIDER NOTES
"  History     Chief Complaint:  Shortness of breath      HPI   Duane V Edelman is a 82 year old male on Plavix with history of NSTEMI and hypertension who presents due to shortness of breath. Duane states that he has been experiencing shortness of breath with exertion for the past 2-3 weeks, as well as some daily chest tightness with exertion that has been present for years. Tonight, however, at 0000 he woke up and after moving around noticed his chest tightness and shortness of breath as well as a new \"leaking sound\" with breathing that sound \"like a gurgle\" when he breaths. Duane does report that he took one nitroglycerin around 0030 with no relief. During evaluation, Duane notes that the shortness of breath with the new \"gurgle sound\" is his main concern, and is ultimately what prompted him to present today. He notes that when he woke up his chest tightness was worse, and he states that is has been worsening for the past few weeks. He also reports that he's had leg swelling for the past month. Duane otherwise denies any abdominal pain, tobacco use, or history of lung disease. Of note, Duane states that he has been compliant with taking his medications.     Independent Historian:   None - Patient Only    Review of External Notes:   Outpatient cardiology notes notes reviewed.      Medications:    Rosuvastatin   Nitroglycerin  Amlodipine   Aspirin 81 mg   Carvedilol   Plavix   Furosemide   Imdur   Ramipril   Ranolazine     Past Medical History:    Ascending aorta dilation  Macular dystrophy   Refractory angina  Hyperlipidemia  NSTEMI  Hyperlipidemia   CAD   Nuclear cataract   Hypertension   Arthritis     Past Surgical History:    CABG x2  Cataract extraction with IOL, left   Cholecystectomy  Coronary stent placement x2  EGD x2  Angiocardiogram   x2  Endoscopy     Physical Exam     Patient Vitals for the past 24 hrs:   BP Temp Temp src Pulse Resp SpO2 Weight   05/30/23 0545 118/75 -- -- 62 -- 97 % --   05/30/23 0530 " 118/77 -- -- 60 -- 97 % --   05/30/23 0515 106/70 -- -- 62 -- 96 % --   05/30/23 0500 119/74 -- -- 62 -- 98 % --   05/30/23 0445 113/70 -- -- 64 -- 96 % --   05/30/23 0434 115/75 -- -- 70 22 98 % --   05/30/23 0430 115/75 -- -- 74 -- 96 % --   05/30/23 0405 117/70 -- -- 68 -- 99 % --   05/30/23 0400 115/73 -- -- 71 -- 100 % --   05/30/23 0355 125/73 -- -- 70 -- 99 % --   05/30/23 0350 126/76 -- -- 72 -- 96 % --   05/30/23 0345 115/73 -- -- 68 -- 96 % --   05/30/23 0340 117/79 -- -- 70 -- 95 % --   05/30/23 0335 119/73 -- -- 71 -- 94 % --   05/30/23 0330 125/74 -- -- 75 -- 95 % --   05/30/23 0325 128/84 -- -- 80 -- 96 % --   05/30/23 0320 129/87 -- -- 79 -- 98 % --   05/30/23 0315 (!) 148/92 -- -- 82 -- 99 % --   05/30/23 0310 (!) 152/94 -- -- 89 -- 97 % --   05/30/23 0305 (!) 156/108 -- -- 89 -- 95 % --   05/30/23 0300 (!) 151/98 -- -- 92 -- 96 % --   05/30/23 0258 (!) 155/102 -- -- 93 -- 95 % --   05/30/23 0253 (!) 149/105 -- -- 98 -- 95 % --   05/30/23 0245 (!) 149/134 -- -- 100 -- 94 % --   05/30/23 0234 (!) 154/104 -- -- 99 (!) 35 90 % --   05/30/23 0232 (!) 154/104 -- -- 98 -- (!) 89 % --   05/30/23 0230 -- -- -- 97 (!) 35 (!) 89 % --   05/30/23 0218 -- -- -- 86 -- (!) 87 % --   05/30/23 0215 -- -- -- 84 22 (!) 88 % --   05/30/23 0150 (!) 148/90 -- -- 73 -- 94 % --   05/30/23 0143 (!) 139/92 -- -- 75 20 (!) 85 % --   05/30/23 0106 131/81 97.1  F (36.2  C) Temporal 74 -- 92 % 93.7 kg (206 lb 9.1 oz)        Physical Exam  General: Elderly male sitting upright  Eyes: PERRL, Conjunctive within normal limits  ENT: Moist mucous membranes, oropharynx clear.   CV: Normal S1S2.  Systolic murmur appreciated.. Regular rate and rhythm  Resp: Coarse bilaterally.  Increased work of breathing.  Tachypneic.  GI: Abdomen is soft, nontender and nondistended. No palpable masses. No rebound or guarding.  MSK: Sitting lower extremity edema bilaterally. Nontender. Normal active range of motion.  Skin: Warm and dry.  Generalized  pallor.  No rashes or lesions or ecchymoses on visible skin.  Neuro: Alert and oriented. Responds appropriately to all questions and commands. No focal findings appreciated. Normal muscle tone.  Psych: Appropriate mood and affect.    Emergency Department Course   ECG 1  ECG taken at 0103, ECG read at 0125  Sinus rhythm with 1st degree AV block  ST & T wave abnormality, consider inferolateral ischemia  Abnormal ECG   Rate 74 bpm. CO interval 214 ms. QRS duration 98 ms. QT/QTc 406/450 ms. P-R-T axes 76 -27 158.     ECG 2  ECG taken at 0218, ECG read at 0220  Sinus rhythm with 1st degree AV block  Left axis deviation  Pulmonary disease pattern  Minimal voltage criteria for LVH, may be normal variant ([place] product)  ST & T wave abnormality, consider lateral ischemia  Abnormal ECG   Rate 89 bpm. CO interval 248 ms. QRS duration 96 ms. QT/QTc 376/457 ms. P-R-T axes 94 -34 117.     Imaging:  CT Chest Pulmonary Embolism w Contrast   Final Result   IMPRESSION:   1.  Interstitial septal thickening. Bibasilar atelectasis or infiltrate and small effusions. Findings may be due to CHF. Superimposed basilar pneumonia not excluded.   2.  Mildly aneurysmal ascending thoracic aorta.   3.  Prominent mediastinal lymph nodes.      Echocardiogram Limited   Final Result      XR Chest Port 1 View   Final Result   IMPRESSION: Cardiac enlargement. Median sternotomy. Pulmonary vascular congestion. Increased interstitial and airspace infiltrates. Trace effusions.      XR Chest Port 1 View   Final Result   IMPRESSION: Prior median sternotomy and CABG changes, with borderline cardiac enlargement. Moderate pulmonary vascular congestion and interstitial edema. Probable trace pleural effusions. Superimposed infectious infiltrates would be difficult to exclude.    No pneumothorax. No acute osseous abnormality.         Report per radiology     Echocardiogram results:  Interpretation Summary  The left ventricle is normal in size.  There is normal  left ventricular wall thickness.  The visual ejection fraction is 35-40%.  There is moderate to severe inferolateral wall hypokinesis.  The right ventricle is normal size.  The right ventricular systolic function is normal.  Thickened aortic valve leaflets with moderate-severe sclerosis There is mild  (1+) aortic regurgitation and mild valvular aortic stenosis with mean  grradient 14 mmHg  The ascending aorta is Mildly dilated at 4.2 CM  Compared to the prior study from 2021 the EF has decreased and there is now  inferior-inferolateral hypokinesis    Laboratory:  Labs Ordered and Resulted from Time of ED Arrival to Time of ED Departure   BASIC METABOLIC PANEL - Abnormal       Result Value    Sodium 136      Potassium 4.4      Chloride 102      Carbon Dioxide (CO2) 22      Anion Gap 12      Urea Nitrogen 25.3 (*)     Creatinine 1.12      Calcium 9.1      Glucose 107 (*)     GFR Estimate 66     TROPONIN T, HIGH SENSITIVITY - Abnormal    Troponin T, High Sensitivity 42 (*)    CBC WITH PLATELETS AND DIFFERENTIAL - Abnormal    WBC Count 4.6      RBC Count 4.04 (*)     Hemoglobin 8.8 (*)     Hematocrit 29.9 (*)     MCV 74 (*)     MCH 21.8 (*)     MCHC 29.4 (*)     RDW 18.0 (*)     Platelet Count 235      % Neutrophils 60      % Lymphocytes 20      % Monocytes 15      % Eosinophils 4      % Basophils 1      % Immature Granulocytes 0      NRBCs per 100 WBC 0      Absolute Neutrophils 2.7      Absolute Lymphocytes 0.9      Absolute Monocytes 0.7      Absolute Eosinophils 0.2      Absolute Basophils 0.1      Absolute Immature Granulocytes 0.0      Absolute NRBCs 0.0     TSH WITH FREE T4 REFLEX - Abnormal    TSH 6.00 (*)    D DIMER QUANTITATIVE - Abnormal    D-Dimer Quantitative 1.24 (*)    BLOOD GAS ARTERIAL - Abnormal    pH Arterial 7.37      pCO2 Arterial 34 (*)     pO2 Arterial 80      FIO2 60      Bicarbonate Arterial 19 (*)     Base Excess/Deficit (+/-) -5.2      Peter's Test Yes     TROPONIN T, HIGH SENSITIVITY -  Abnormal    Troponin T, High Sensitivity 46 (*)    NT PROBNP INPATIENT - Normal    N terminal Pro BNP Inpatient 1,359     ISTAT CREATININE POCT - Normal    Creatinine POCT 1.2      GFR, ESTIMATED POCT >60     MAGNESIUM - Normal    Magnesium 2.0     T4 FREE - Normal    Free T4 1.14     TYPE AND SCREEN, ADULT    ABO/RH(D) A POS      Antibody Screen Negative      SPECIMEN EXPIRATION DATE [id number]     ABO/RH TYPE AND SCREEN        Procedures   none    Emergency Department Course & Assessments:       Interventions:  Medications   heparin infusion 25,000 units in D5W 250 mL ANTICOAGULANT (1,100 Units/hr Intravenous $New Bag 5/30/23 0301)   nitroGLYcerin 50 mg in D5W 250 mL (adult std) infusion CENTRAL (70 mcg/min Intravenous Rate/Dose Change 5/30/23 0316)   propofol (DIPRIVAN) 1000 MG/100ML infusion (has no administration in time range)   furosemide (LASIX) injection 60 mg (60 mg Intravenous $Given 5/30/23 0212)   heparin loading dose for LOW INTENSITY TREATMENT * Give BEFORE starting heparin infusion (5,600 Units Intravenous $Given 5/30/23 0259)   furosemide (LASIX) injection 20 mg (20 mg Intravenous $Given 5/30/23 0258)   perflutren diluted 1mL to 2mL with saline (OPTISON) diluted injection 2 mL (2 mLs Intravenous $Given 5/30/23 0400)   sodium chloride (PF) 0.9% PF flush 10 mL (10 mLs Intravenous $Given 5/30/23 0401)   iopamidol (ISOVUE-370) solution 500 mL (68 mLs Intravenous $Given 5/30/23 0422)   sodium chloride (PF) 0.9% PF flush 100 mL (87 mLs Intravenous $Given 5/30/23 0422)        Assessments:  0125 I obtained history and examined the patient as noted above.     0217 I was called into the patient's room as he had worsening shortness of breath. I paged respiratory therapy, who was at bedside within one minute.     0236 I rechecked the patient. His breathing has continued to worsen.     0304 I rechecked the patient. He states that he feels improved.    0344 I rechecked the patient. He appears improved and his  work of breathing has gone down.      0429 I rechecked and updated the patient. We discussed transfer, and he agreed to the plan of care.  He continues to remain improved and stable at this time.    Independent Interpretation (X-rays, CTs, rhythm strip):  I reviewed the patient's chest x-ray.  Pulmonary edema suspected.  No pneumothorax noted.  I reviewed the patient's CT scan.  No central pulmonary embolism.  Pleural effusions noted.    Consultations/Discussion of Management or Tests:  0415 I consulted with Dr. Graves of the hospitalist service and discussed patient admission. He accepted care of the patient if there was an ICU bed available.  0428 I was notified by nursing staff that there were no available ICU beds at [facility].    3601 I spoke with both Dr. Bishop with the [facility] hospitalist service and Dr. Castro with the [facility] intensivist service about the patient's history and plan of care. Both Dr. Bishop and Dr. Castro accepted care of the patient.        Social Determinants of Health affecting care:   None    Disposition:  The patient will be transferred to [facility] via EMS. Dr. Bishop and Dr. Castro accepted the patient for transfer.  My colleague Dr. Alejandre will take over care of the patient while still in the ED.    Impression & Plan    CMS Diagnoses: None      Medical Decision Making:  Duane V Edelman is a 82 year old male with a PMH of coronary artery disease, aortic sclerosis, hypertension currently on Plavix and Lasix who presents for evaluation of shortness of breath and lower extremity edema with associated chest tightness that started just prior to arrival.  I considered a broad differential of their dyspnea including CHF exacerbation, PE, dissection, hemothorax, pleural effusion, pneumonia, acute coronary syndrome, reactive airway disease, bronchitis, upper airway obstruction, foreign body, etc.  Given the history and exam plus laboratory findings I feel congestive heart failure is the most  likely etiology for his clinical findings on examination as well as laboratory and imaging results.  He rapidly decompensated shortly after arrival and required BiPAP, IV diuresis and nitroglycerin infusion.  Heparin was administered given concerns for new ECG changes, wall motion abnormalities on echo with reduced ejection fraction from past with concerns for new cardiac insults/ischemia.  CT chest did not show evidence of pulmonary embolism.  The patient was hemodynamically improved and feeling subjectively much better on reassessment, but continuing to require BiPAP and nitro infusion.  ICU care was thought best for the patient.  Unfortunately there are no ICU beds available in this hospital.  The patient preferred transfer [facility] is his cardiology group today.  This seems reasonable.  Patient remained stable at the time of this dictation.  Soon to be transferred to [facility].    Critical Care Time: was 53 minutes for this patient excluding procedures.     Diagnosis:    ICD-10-CM    1. Acute respiratory failure with hypoxia (H)  J96.01       2. Acute congestive heart failure, unspecified heart failure type (H)  I50.9       3. Anemia, unspecified type  D64.9           Scribe Disclosure:  Jennifer LOWE, am serving as a scribe at 1:34 AM on 5/30/2023 to document services personally performed by Dai Gilmore MD based on my observations and the provider's statements to me.     5/30/2023   Dai Gilmore MD Jonkman, Tracy Dianne, MD  05/30/23 0618

## 2023-05-30 NOTE — ED TRIAGE NOTES
Patient has been decreasing energy and fatigue for the past 2 weeks. CP woke him up from sleep and SOB with exertion. 1 nitroglycerin around 0030 with no relief. Hx of stents and open heart sx

## 2023-09-08 NOTE — ED TRIAGE NOTES
Pt here with c/o chest pain that started at about 1:30am with radiating pain down both arms and shoulders. Pt took about 3 nitroglycerin at home with no relief. Pt states he has a significant cardiac history.

## 2023-09-08 NOTE — ED PROVIDER NOTES
History     Chief Complaint:  Chest Pain       HPI   Duane V Edelman is a 83 year old male with significant cardiac history and CHF who presents with onset of chest tightness that woke him up at 130 this morning.  Patient states he is felt some gurgling in his left side of his chest last couple days although he is been feeling well besides that.  He notes that he did have a recent admission to [facility] in May of this year.  He was in ICU for CHF and lost 15 pounds from diuresis.  His hospital record did show that he was diuresed for diastolic heart failure.  He was noted to have anemia and received iron transfusion.  He was also seen by GI who thought maybe he had a small GI bleed but was not treated.  He states that he did take 3 nitro today which did not seem to significantly help.  The symptom is better at this point.  The tightness radiated to his bilateral shoulders but did not go down his arms.  He describes no pain with this.  No nausea or vomiting.  Denies any dizziness or diaphoresis.  His wife notes that his legs seem more swollen than usual.  She only noticed that today.  They stated that his legs were way more swollen with his last admission but today is definitely more off his baseline.  No fevers or chills or cough.      Independent Historian:   None - Patient Only    Review of External Notes:   Admission to [facility] for CHF 5/20/23      Medications:    amLODIPine (NORVASC) 5 MG tablet  aspirin (ASA) 81 MG chewable tablet  carvedilol (COREG) 3.125 MG tablet  clopidogrel (PLAVIX) 75 MG tablet  co-enzyme Q-10 100 MG CAPS capsule  furosemide (LASIX) 20 MG tablet  isosorbide mononitrate (IMDUR) 30 MG 24 hr tablet  multivitamin w/minerals (THERA-VIT-M) tablet  ramipril (ALTACE) 10 MG capsule  ranolazine (RANEXA) 1000 MG TB12 12 hr tablet  ranolazine (RANEXA) 500 MG 12 hr tablet  rosuvastatin (CRESTOR) 20 MG tablet        Past Medical History:    Past Medical History:   Diagnosis Date     "Benign essential hypertension     CAD (coronary artery disease)     Cataract     Hyperlipidemia     Infection due to 2019 novel coronavirus 11/2021    Psoriasis        Past Surgical History:    Past Surgical History:   Procedure Laterality Date    CABG NOS  12/2008    2-vessel,  (x2) LIMA to LAD and SVG to Diag-1    Cataract surgery NOS Left     CHOLECYSTECTOMY      Coronary artery stent placement  2014    PCI and IVORY to LM, IVORY to Ramus, IVORY to ostial Cx    ESOPHAGOSCOPY, GASTROSCOPY, DUODENOSCOPY (EGD), COMBINED          Physical Exam   Patient Vitals for the past 24 hrs:   BP Temp Temp src Pulse Resp SpO2 Height Weight   09/08/23 0615 (!) 142/68 98  F (36.7  C) Oral 58 18 98 % 1.676 m (5' 6\") 88.5 kg (195 lb)        Physical Exam  Constitutional:       Appearance: He is well-developed.   HENT:      Right Ear: External ear normal.      Left Ear: External ear normal.      Mouth/Throat:      Mouth: Mucous membranes are moist.      Pharynx: Oropharynx is clear. No oropharyngeal exudate or posterior oropharyngeal erythema.   Eyes:      General: No scleral icterus.     Conjunctiva/sclera: Conjunctivae normal.      Pupils: Pupils are equal, round, and reactive to light.   Cardiovascular:      Rate and Rhythm: Normal rate and regular rhythm.      Heart sounds: Normal heart sounds. No murmur heard.     No friction rub. No gallop.   Pulmonary:      Effort: Pulmonary effort is normal. No respiratory distress.      Breath sounds: Normal breath sounds. No wheezing or rales.   Abdominal:      General: Bowel sounds are normal. There is no distension.      Palpations: Abdomen is soft. There is no mass.      Tenderness: There is no abdominal tenderness.   Musculoskeletal:         General: Normal range of motion.      Cervical back: Normal range of motion and neck supple.      Right lower leg: Edema present.      Left lower leg: Edema present.   Skin:     General: Skin is warm and dry.      Capillary Refill: Capillary refill takes " less than 2 seconds.      Findings: No rash.   Neurological:      General: No focal deficit present.      Mental Status: He is alert.           Emergency Department Course   ECG  ECG taken at 0627, ECG read at 0628  No acute ST changes  Rate 64 bpm. SD interval 218 ms. QRS duration 92 ms. QT/QTc 436/449 ms. P-R-T axes -7 81 -51.     Imaging:  CT Chest Pulmonary Embolism w Contrast   Final Result   IMPRESSION:   1.  No evidence of acute pulmonary thromboembolic disease.   2.  No significant airspace disease or pleural fluid.   3.  Cardiomegaly and prior CABG. Dilatation of the pulmonary trunk   which can be seen with pulmonary hypertension.      KEMAL PARKER MD            SYSTEM ID:  [id number]      XR Chest 2 Views   Final Result   IMPRESSION: Enlarged cardiac silhouette. Prior CABG. Platelike opacity   of the left lung base is favored atelectasis. No theron pulmonary   edema. No pleural effusion or pneumothorax. Median sternotomy wires.      KEMAL PARKER MD            SYSTEM ID:  [id number]         Report per radiology    Laboratory:  Labs Ordered and Resulted from Time of ED Arrival to Time of ED Departure   BASIC METABOLIC PANEL - Abnormal       Result Value    Sodium 137      Potassium 4.5      Chloride 104      Carbon Dioxide (CO2) 23      Anion Gap 10      Urea Nitrogen 19.8      Creatinine 1.02      Calcium 9.1      Glucose 115 (*)     GFR Estimate 73     TROPONIN T, HIGH SENSITIVITY - Abnormal    Troponin T, High Sensitivity 31 (*)    CBC WITH PLATELETS AND DIFFERENTIAL - Abnormal    WBC Count 3.7 (*)     RBC Count 4.74      Hemoglobin 12.3 (*)     Hematocrit 38.5 (*)     MCV 81      MCH 25.9 (*)     MCHC 31.9      RDW 18.6 (*)     Platelet Count 204      % Neutrophils 50      % Lymphocytes 32      % Monocytes 14      % Eosinophils 3      % Basophils 1      % Immature Granulocytes 0      NRBCs per 100 WBC 0      Absolute Neutrophils 1.8      Absolute Lymphocytes 1.2      Absolute Monocytes 0.5       Absolute Eosinophils 0.1      Absolute Basophils 0.0      Absolute Immature Granulocytes 0.0      Absolute NRBCs 0.0     TROPONIN T, HIGH SENSITIVITY - Abnormal    Troponin T, High Sensitivity 35 (*)    D DIMER QUANTITATIVE - Abnormal    D-Dimer Quantitative 1.14 (*)    INR - Normal    INR 0.99     NT PROBNP INPATIENT - Normal    N terminal Pro BNP Inpatient 845     HEPATIC FUNCTION PANEL - Normal    Protein Total 7.2      Albumin 4.4      Bilirubin Total 0.4      Alkaline Phosphatase 61      AST 20      ALT 16      Bilirubin Direct <0.20            Emergency Department Course & Assessments:             Interventions:  Medications - No data to display     Assessments:  0628 Exam    Independent Interpretation (X-rays, CTs, rhythm strip):  None    Consultations/Discussion of Management or Tests:  , [facility] cardiology        Social Determinants of Health affecting care:   None    Disposition:  The patient was discharged to home.     Impression & Plan        Medical Decision Making:  Patient presents today for evaluation of chest tightness.  Patient was noted to also have increased leg swelling.  He did admit to having had 2 pound weight gain since he measured himself yesterday.  His vital signs are otherwise stable.  There is no hypoxia.  EKG showed no change.  Troponin is elevated although there is no significant change on the delta troponin. CXR showed no pleural effusion or pulmonary edema.  BNP was not significantly elevated definitely lower than his prior admission.  Given his leg swelling and the chest tightness, it is likely that he is having some fluid retention and may be a mild exacerbation of his diastolic heart failure.  I discussed the case with Dr. Carranza from [facility] cardiology.  We were not able to find a consult from the [facility] cardiology group.  After reviewing the records, Dr. Carranza recommended increasing his Lasix to 60 mg daily at least for next few days.  I discussed this with the patient and  he was comfortable with doing extra 20 mg daily for 3 days and then review with his cardiologist on Monday.  He is also asked to weigh himself daily.  Dr. Carranza thought the patient might benefit from switching to torsemide rather than Lasix.  This discussion can be left up to the patient's own cardiologist at [place].  Patient understand instructions and is comfortable with the follow-up plan.  He is advised to return to the ER right away if symptoms are worsening or not improving.  He is also asked to return if he has chest pain, difficulty breathing, fever or worsening swelling.  His daughter and wife were at bedside with this discussion and they are in agreement with the treatment plan.  Prescription of the extra Lasix was sent to his pharmacy.      Diagnosis:    ICD-10-CM    1. Acute on chronic diastolic congestive heart failure (H)  I50.33            Discharge Medications:  Discharge Medication List as of 9/8/2023 10:36 AM               9/8/2023   Lo Bedoya MD Cheng, Wenlan, MD  09/08/23 1223

## 2023-09-08 NOTE — DISCHARGE INSTRUCTIONS
Take an additional 20 mg of Lasix in the evening.  Continue your 40 mg of Lasix in the morning.  Please weigh yourself daily.  Follow-up with either your cardiologist or primary doctor on Monday to evaluate your symptoms.  If you feel worse worsening shortness of breath or swelling, you need to come back to the ER right away.